# Patient Record
Sex: FEMALE | Race: WHITE | NOT HISPANIC OR LATINO | Employment: OTHER | URBAN - METROPOLITAN AREA
[De-identification: names, ages, dates, MRNs, and addresses within clinical notes are randomized per-mention and may not be internally consistent; named-entity substitution may affect disease eponyms.]

---

## 2017-01-01 ENCOUNTER — HOSPITAL ENCOUNTER (INPATIENT)
Facility: OTHER | Age: 77
LOS: 4 days | DRG: 208 | End: 2017-12-22
Attending: INTERNAL MEDICINE | Admitting: HOSPITALIST
Payer: MEDICARE

## 2017-01-01 VITALS
SYSTOLIC BLOOD PRESSURE: 134 MMHG | WEIGHT: 246.94 LBS | OXYGEN SATURATION: 94 % | BODY MASS INDEX: 45.44 KG/M2 | DIASTOLIC BLOOD PRESSURE: 60 MMHG | TEMPERATURE: 98 F | HEIGHT: 62 IN

## 2017-01-01 DIAGNOSIS — J96.21 ACUTE ON CHRONIC RESPIRATORY FAILURE WITH HYPOXIA AND HYPERCAPNIA: ICD-10-CM

## 2017-01-01 DIAGNOSIS — I10 ESSENTIAL HYPERTENSION: ICD-10-CM

## 2017-01-01 DIAGNOSIS — Z79.4 TYPE 2 DIABETES MELLITUS WITH STAGE 4 CHRONIC KIDNEY DISEASE, WITH LONG-TERM CURRENT USE OF INSULIN: ICD-10-CM

## 2017-01-01 DIAGNOSIS — N18.4 TYPE 2 DIABETES MELLITUS WITH STAGE 4 CHRONIC KIDNEY DISEASE, WITH LONG-TERM CURRENT USE OF INSULIN: ICD-10-CM

## 2017-01-01 DIAGNOSIS — I46.9 CARDIAC ARREST: ICD-10-CM

## 2017-01-01 DIAGNOSIS — E11.22 TYPE 2 DIABETES MELLITUS WITH STAGE 4 CHRONIC KIDNEY DISEASE, WITH LONG-TERM CURRENT USE OF INSULIN: ICD-10-CM

## 2017-01-01 DIAGNOSIS — I46.9 SUDDEN CARDIAC ARREST: ICD-10-CM

## 2017-01-01 DIAGNOSIS — J96.22 ACUTE ON CHRONIC RESPIRATORY FAILURE WITH HYPOXIA AND HYPERCAPNIA: ICD-10-CM

## 2017-01-01 DIAGNOSIS — I50.30 (HFPEF) HEART FAILURE WITH PRESERVED EJECTION FRACTION: ICD-10-CM

## 2017-01-01 DIAGNOSIS — R07.9 CHEST PAIN: ICD-10-CM

## 2017-01-01 DIAGNOSIS — J44.9 CHRONIC OBSTRUCTIVE PULMONARY DISEASE, UNSPECIFIED COPD TYPE: ICD-10-CM

## 2017-01-01 LAB
ALBUMIN SERPL BCP-MCNC: 1.8 G/DL
ALBUMIN SERPL BCP-MCNC: 1.8 G/DL
ALBUMIN SERPL BCP-MCNC: 1.9 G/DL
ALLENS TEST: ABNORMAL
ALP SERPL-CCNC: 111 U/L
ALP SERPL-CCNC: 152 U/L
ALP SERPL-CCNC: 84 U/L
ALT SERPL W/O P-5'-P-CCNC: 53 U/L
ALT SERPL W/O P-5'-P-CCNC: 56 U/L
ALT SERPL W/O P-5'-P-CCNC: 68 U/L
AMORPH CRY URNS QL MICRO: ABNORMAL
ANION GAP SERPL CALC-SCNC: 10 MMOL/L
ANION GAP SERPL CALC-SCNC: 11 MMOL/L
ANION GAP SERPL CALC-SCNC: 12 MMOL/L
ANION GAP SERPL CALC-SCNC: 13 MMOL/L
ANION GAP SERPL CALC-SCNC: 13 MMOL/L
ANION GAP SERPL CALC-SCNC: 14 MMOL/L
ANION GAP SERPL CALC-SCNC: 8 MMOL/L
ANION GAP SERPL CALC-SCNC: 9 MMOL/L
APTT BLDCRRT: 26.1 SEC
AST SERPL-CCNC: 169 U/L
AST SERPL-CCNC: 69 U/L
AST SERPL-CCNC: 98 U/L
BACTERIA #/AREA URNS HPF: ABNORMAL /HPF
BACTERIA SPEC AEROBE CULT: NORMAL
BACTERIA UR CULT: NO GROWTH
BASOPHILS # BLD AUTO: 0 K/UL
BASOPHILS # BLD AUTO: 0.01 K/UL
BASOPHILS # BLD AUTO: 0.02 K/UL
BASOPHILS # BLD AUTO: ABNORMAL K/UL
BASOPHILS NFR BLD: 0 %
BASOPHILS NFR BLD: 0 %
BASOPHILS NFR BLD: 0.1 %
BASOPHILS NFR BLD: 0.1 %
BILIRUB SERPL-MCNC: 0.4 MG/DL
BILIRUB SERPL-MCNC: 0.5 MG/DL
BILIRUB SERPL-MCNC: 0.6 MG/DL
BILIRUB UR QL STRIP: NEGATIVE
BNP SERPL-MCNC: 41 PG/ML
BUN SERPL-MCNC: 22 MG/DL
BUN SERPL-MCNC: 27 MG/DL
BUN SERPL-MCNC: 29 MG/DL
BUN SERPL-MCNC: 32 MG/DL
BUN SERPL-MCNC: 33 MG/DL
BUN SERPL-MCNC: 36 MG/DL
BUN SERPL-MCNC: 37 MG/DL
BUN SERPL-MCNC: 37 MG/DL
BUN SERPL-MCNC: 43 MG/DL
BUN SERPL-MCNC: 45 MG/DL
BUN SERPL-MCNC: 46 MG/DL
BUN SERPL-MCNC: 50 MG/DL
BUN SERPL-MCNC: 61 MG/DL
BURR CELLS BLD QL SMEAR: ABNORMAL
BURR CELLS BLD QL SMEAR: ABNORMAL
CA-I BLDV-SCNC: 0.99 MMOL/L
CA-I BLDV-SCNC: 1.01 MMOL/L
CA-I BLDV-SCNC: 1.04 MMOL/L
CA-I BLDV-SCNC: 1.07 MMOL/L
CA-I BLDV-SCNC: 1.1 MMOL/L
CALCIUM SERPL-MCNC: 7.1 MG/DL
CALCIUM SERPL-MCNC: 7.5 MG/DL
CALCIUM SERPL-MCNC: 7.6 MG/DL
CALCIUM SERPL-MCNC: 7.7 MG/DL
CALCIUM SERPL-MCNC: 7.8 MG/DL
CALCIUM SERPL-MCNC: 8 MG/DL
CHLORIDE SERPL-SCNC: 100 MMOL/L
CHLORIDE SERPL-SCNC: 101 MMOL/L
CHLORIDE SERPL-SCNC: 102 MMOL/L
CHLORIDE SERPL-SCNC: 102 MMOL/L
CHLORIDE SERPL-SCNC: 103 MMOL/L
CHLORIDE SERPL-SCNC: 103 MMOL/L
CHLORIDE SERPL-SCNC: 106 MMOL/L
CHLORIDE SERPL-SCNC: 107 MMOL/L
CHLORIDE SERPL-SCNC: 107 MMOL/L
CHLORIDE SERPL-SCNC: 109 MMOL/L
CHLORIDE SERPL-SCNC: 109 MMOL/L
CHLORIDE SERPL-SCNC: 112 MMOL/L
CHLORIDE SERPL-SCNC: 114 MMOL/L
CHOLEST SERPL-MCNC: 151 MG/DL
CHOLEST/HDLC SERPL: 3.1 {RATIO}
CK MB SERPL-MCNC: 25.9 NG/ML
CK MB SERPL-MCNC: 42.7 NG/ML
CK MB SERPL-MCNC: 89 NG/ML
CK MB SERPL-RTO: 2.7 %
CK MB SERPL-RTO: 4.2 %
CK MB SERPL-RTO: 4.5 %
CK SERPL-CCNC: 1015 U/L
CK SERPL-CCNC: 1015 U/L
CK SERPL-CCNC: 2991 U/L
CK SERPL-CCNC: 3330 U/L
CK SERPL-CCNC: 578 U/L
CK SERPL-CCNC: 578 U/L
CK SERPL-CCNC: 875 U/L
CK SERPL-CCNC: ABNORMAL U/L
CLARITY UR: ABNORMAL
CO2 SERPL-SCNC: 17 MMOL/L
CO2 SERPL-SCNC: 17 MMOL/L
CO2 SERPL-SCNC: 18 MMOL/L
CO2 SERPL-SCNC: 19 MMOL/L
CO2 SERPL-SCNC: 20 MMOL/L
CO2 SERPL-SCNC: 22 MMOL/L
CO2 SERPL-SCNC: 23 MMOL/L
CO2 SERPL-SCNC: 24 MMOL/L
COLOR UR: YELLOW
CREAT SERPL-MCNC: 1.3 MG/DL
CREAT SERPL-MCNC: 1.6 MG/DL
CREAT SERPL-MCNC: 1.8 MG/DL
CREAT SERPL-MCNC: 2 MG/DL
CREAT SERPL-MCNC: 2 MG/DL
CREAT SERPL-MCNC: 2.1 MG/DL
CREAT SERPL-MCNC: 2.2 MG/DL
CREAT SERPL-MCNC: 2.3 MG/DL
CREAT SERPL-MCNC: 2.4 MG/DL
CREAT SERPL-MCNC: 3.2 MG/DL
CREAT SERPL-MCNC: 3.8 MG/DL
DELSYS: ABNORMAL
DIASTOLIC DYSFUNCTION: YES
DIFFERENTIAL METHOD: ABNORMAL
EOSINOPHIL # BLD AUTO: 0 K/UL
EOSINOPHIL # BLD AUTO: ABNORMAL K/UL
EOSINOPHIL NFR BLD: 0 %
EOSINOPHIL NFR BLD: 0 %
EOSINOPHIL NFR BLD: 0.1 %
EOSINOPHIL NFR BLD: 0.4 %
ERYTHROCYTE [DISTWIDTH] IN BLOOD BY AUTOMATED COUNT: 13.7 %
ERYTHROCYTE [DISTWIDTH] IN BLOOD BY AUTOMATED COUNT: 14.2 %
ERYTHROCYTE [DISTWIDTH] IN BLOOD BY AUTOMATED COUNT: 15.2 %
ERYTHROCYTE [DISTWIDTH] IN BLOOD BY AUTOMATED COUNT: 15.4 %
ERYTHROCYTE [SEDIMENTATION RATE] IN BLOOD BY WESTERGREN METHOD: 14 MM/H
ERYTHROCYTE [SEDIMENTATION RATE] IN BLOOD BY WESTERGREN METHOD: 14 MM/H
ERYTHROCYTE [SEDIMENTATION RATE] IN BLOOD BY WESTERGREN METHOD: 18 MM/H
ERYTHROCYTE [SEDIMENTATION RATE] IN BLOOD BY WESTERGREN METHOD: 18 MM/H
ERYTHROCYTE [SEDIMENTATION RATE] IN BLOOD BY WESTERGREN METHOD: 20 MM/H
ERYTHROCYTE [SEDIMENTATION RATE] IN BLOOD BY WESTERGREN METHOD: 21 MM/H
ERYTHROCYTE [SEDIMENTATION RATE] IN BLOOD BY WESTERGREN METHOD: 21 MM/H
ERYTHROCYTE [SEDIMENTATION RATE] IN BLOOD BY WESTERGREN METHOD: 26 MM/H
ERYTHROCYTE [SEDIMENTATION RATE] IN BLOOD BY WESTERGREN METHOD: 28 MM/H
EST. GFR  (AFRICAN AMERICAN): 13 ML/MIN/1.73 M^2
EST. GFR  (AFRICAN AMERICAN): 15 ML/MIN/1.73 M^2
EST. GFR  (AFRICAN AMERICAN): 22 ML/MIN/1.73 M^2
EST. GFR  (AFRICAN AMERICAN): 23 ML/MIN/1.73 M^2
EST. GFR  (AFRICAN AMERICAN): 24 ML/MIN/1.73 M^2
EST. GFR  (AFRICAN AMERICAN): 26 ML/MIN/1.73 M^2
EST. GFR  (AFRICAN AMERICAN): 27 ML/MIN/1.73 M^2
EST. GFR  (AFRICAN AMERICAN): 27 ML/MIN/1.73 M^2
EST. GFR  (AFRICAN AMERICAN): 31 ML/MIN/1.73 M^2
EST. GFR  (AFRICAN AMERICAN): 36 ML/MIN/1.73 M^2
EST. GFR  (AFRICAN AMERICAN): 46 ML/MIN/1.73 M^2
EST. GFR  (NON AFRICAN AMERICAN): 11 ML/MIN/1.73 M^2
EST. GFR  (NON AFRICAN AMERICAN): 13 ML/MIN/1.73 M^2
EST. GFR  (NON AFRICAN AMERICAN): 19 ML/MIN/1.73 M^2
EST. GFR  (NON AFRICAN AMERICAN): 20 ML/MIN/1.73 M^2
EST. GFR  (NON AFRICAN AMERICAN): 21 ML/MIN/1.73 M^2
EST. GFR  (NON AFRICAN AMERICAN): 22 ML/MIN/1.73 M^2
EST. GFR  (NON AFRICAN AMERICAN): 24 ML/MIN/1.73 M^2
EST. GFR  (NON AFRICAN AMERICAN): 24 ML/MIN/1.73 M^2
EST. GFR  (NON AFRICAN AMERICAN): 27 ML/MIN/1.73 M^2
EST. GFR  (NON AFRICAN AMERICAN): 31 ML/MIN/1.73 M^2
EST. GFR  (NON AFRICAN AMERICAN): 40 ML/MIN/1.73 M^2
ESTIMATED AVG GLUCOSE: 246 MG/DL
ESTIMATED AVG GLUCOSE: 249 MG/DL
ETCO2: 19
ETCO2: 27
ETCO2: 29
ETCO2: 32
ETCO2: 33
ETCO2: 35
ETCO2: 40
ETCO2: 43
FIO2: 100
FIO2: 40
FIO2: 45
FIO2: 45
FLOW: 6
GLUCOSE SERPL-MCNC: 146 MG/DL
GLUCOSE SERPL-MCNC: 184 MG/DL
GLUCOSE SERPL-MCNC: 213 MG/DL
GLUCOSE SERPL-MCNC: 214 MG/DL
GLUCOSE SERPL-MCNC: 214 MG/DL
GLUCOSE SERPL-MCNC: 223 MG/DL
GLUCOSE SERPL-MCNC: 229 MG/DL
GLUCOSE SERPL-MCNC: 229 MG/DL
GLUCOSE SERPL-MCNC: 234 MG/DL
GLUCOSE SERPL-MCNC: 253 MG/DL
GLUCOSE SERPL-MCNC: 253 MG/DL
GLUCOSE SERPL-MCNC: 277 MG/DL
GLUCOSE SERPL-MCNC: 362 MG/DL
GLUCOSE SERPL-MCNC: 443 MG/DL
GLUCOSE SERPL-MCNC: 443 MG/DL
GLUCOSE SERPL-MCNC: 479 MG/DL
GLUCOSE SERPL-MCNC: 479 MG/DL
GLUCOSE SERPL-MCNC: 480 MG/DL
GLUCOSE SERPL-MCNC: 480 MG/DL
GLUCOSE UR QL STRIP: ABNORMAL
GRAM STN SPEC: NORMAL
HBA1C MFR BLD HPLC: 10.2 %
HBA1C MFR BLD HPLC: 10.3 %
HCO3 UR-SCNC: 19.1 MMOL/L (ref 24–28)
HCO3 UR-SCNC: 20.6 MMOL/L (ref 24–28)
HCO3 UR-SCNC: 21.1 MMOL/L (ref 24–28)
HCO3 UR-SCNC: 21.1 MMOL/L (ref 24–28)
HCO3 UR-SCNC: 21.2 MMOL/L (ref 24–28)
HCO3 UR-SCNC: 22.1 MMOL/L (ref 24–28)
HCO3 UR-SCNC: 22.5 MMOL/L (ref 24–28)
HCO3 UR-SCNC: 22.7 MMOL/L (ref 24–28)
HCO3 UR-SCNC: 23.5 MMOL/L (ref 24–28)
HCO3 UR-SCNC: 26.9 MMOL/L (ref 24–28)
HCT VFR BLD AUTO: 26.1 %
HCT VFR BLD AUTO: 26.9 %
HCT VFR BLD AUTO: 27.3 %
HCT VFR BLD AUTO: 40.5 %
HDLC SERPL-MCNC: 48 MG/DL
HDLC SERPL: 31.8 %
HGB BLD-MCNC: 12.5 G/DL
HGB BLD-MCNC: 8.4 G/DL
HGB BLD-MCNC: 8.5 G/DL
HGB BLD-MCNC: 9.2 G/DL
HGB UR QL STRIP: ABNORMAL
HYALINE CASTS #/AREA URNS LPF: 0 /LPF
INR PPP: 1.1
KETONES UR QL STRIP: NEGATIVE
LACTATE SERPL-SCNC: 2.6 MMOL/L
LACTATE SERPL-SCNC: 2.7 MMOL/L
LACTATE SERPL-SCNC: 2.7 MMOL/L
LACTATE SERPL-SCNC: 3.6 MMOL/L
LDLC SERPL CALC-MCNC: 82.8 MG/DL
LEUKOCYTE ESTERASE UR QL STRIP: NEGATIVE
LYMPHOCYTES # BLD AUTO: 0.6 K/UL
LYMPHOCYTES # BLD AUTO: 0.7 K/UL
LYMPHOCYTES # BLD AUTO: 0.8 K/UL
LYMPHOCYTES # BLD AUTO: ABNORMAL K/UL
LYMPHOCYTES NFR BLD: 5 %
LYMPHOCYTES NFR BLD: 5.8 %
LYMPHOCYTES NFR BLD: 6 %
LYMPHOCYTES NFR BLD: 7.7 %
MAGNESIUM SERPL-MCNC: 1.1 MG/DL
MAGNESIUM SERPL-MCNC: 1.2 MG/DL
MAGNESIUM SERPL-MCNC: 1.3 MG/DL
MAGNESIUM SERPL-MCNC: 1.5 MG/DL
MAGNESIUM SERPL-MCNC: 1.6 MG/DL
MAGNESIUM SERPL-MCNC: 1.6 MG/DL
MAGNESIUM SERPL-MCNC: 1.7 MG/DL
MAGNESIUM SERPL-MCNC: 1.7 MG/DL
MCH RBC QN AUTO: 27 PG
MCH RBC QN AUTO: 27.6 PG
MCH RBC QN AUTO: 27.7 PG
MCH RBC QN AUTO: 27.7 PG
MCHC RBC AUTO-ENTMCNC: 30.9 G/DL
MCHC RBC AUTO-ENTMCNC: 31.6 G/DL
MCHC RBC AUTO-ENTMCNC: 32.2 G/DL
MCHC RBC AUTO-ENTMCNC: 33.7 G/DL
MCV RBC AUTO: 82 FL
MCV RBC AUTO: 85 FL
MCV RBC AUTO: 86 FL
MCV RBC AUTO: 90 FL
MICROSCOPIC COMMENT: ABNORMAL
MIN VOL: 10.9
MIN VOL: 6
MIN VOL: 7
MIN VOL: 8
MIN VOL: 8.86
MIN VOL: 9
MODE: ABNORMAL
MONOCYTES # BLD AUTO: 0.3 K/UL
MONOCYTES # BLD AUTO: 0.5 K/UL
MONOCYTES # BLD AUTO: 0.6 K/UL
MONOCYTES # BLD AUTO: ABNORMAL K/UL
MONOCYTES NFR BLD: 2 %
MONOCYTES NFR BLD: 2.3 %
MONOCYTES NFR BLD: 4.5 %
MONOCYTES NFR BLD: 6.8 %
NEUTROPHILS # BLD AUTO: 12.4 K/UL
NEUTROPHILS # BLD AUTO: 7.2 K/UL
NEUTROPHILS # BLD AUTO: 9.7 K/UL
NEUTROPHILS NFR BLD: 84.4 %
NEUTROPHILS NFR BLD: 89.7 %
NEUTROPHILS NFR BLD: 91.5 %
NEUTROPHILS NFR BLD: 93 %
NITRITE UR QL STRIP: NEGATIVE
NONHDLC SERPL-MCNC: 103 MG/DL
OVALOCYTES BLD QL SMEAR: ABNORMAL
PCO2 BLDA: 29.5 MMHG (ref 35–45)
PCO2 BLDA: 38.7 MMHG (ref 35–45)
PCO2 BLDA: 41.3 MMHG (ref 35–45)
PCO2 BLDA: 41.5 MMHG (ref 35–45)
PCO2 BLDA: 46.6 MMHG (ref 35–45)
PCO2 BLDA: 51.3 MMHG (ref 35–45)
PCO2 BLDA: 51.5 MMHG (ref 35–45)
PCO2 BLDA: 58.4 MMHG (ref 35–45)
PCO2 BLDA: 63 MMHG (ref 35–45)
PCO2 BLDA: 72.8 MMHG (ref 35–45)
PEEP: 5
PH SMN: 7.13 [PH] (ref 7.35–7.45)
PH SMN: 7.18 [PH] (ref 7.35–7.45)
PH SMN: 7.21 [PH] (ref 7.35–7.45)
PH SMN: 7.25 [PH] (ref 7.35–7.45)
PH SMN: 7.25 [PH] (ref 7.35–7.45)
PH SMN: 7.26 [PH] (ref 7.35–7.45)
PH SMN: 7.31 [PH] (ref 7.35–7.45)
PH SMN: 7.32 [PH] (ref 7.35–7.45)
PH SMN: 7.37 [PH] (ref 7.35–7.45)
PH SMN: 7.42 [PH] (ref 7.35–7.45)
PH UR STRIP: 6 [PH] (ref 5–8)
PHOSPHATE SERPL-MCNC: 1.9 MG/DL
PHOSPHATE SERPL-MCNC: 2.6 MG/DL
PHOSPHATE SERPL-MCNC: 2.7 MG/DL
PHOSPHATE SERPL-MCNC: 2.8 MG/DL
PHOSPHATE SERPL-MCNC: 2.9 MG/DL
PHOSPHATE SERPL-MCNC: 3.7 MG/DL
PHOSPHATE SERPL-MCNC: 4.2 MG/DL
PHOSPHATE SERPL-MCNC: 4.9 MG/DL
PHOSPHATE SERPL-MCNC: 5 MG/DL
PIP: 41
PIP: 50
PIP: 52
PIP: 53
PIP: 54
PIP: 61
PLATELET # BLD AUTO: 135 K/UL
PLATELET # BLD AUTO: 58 K/UL
PLATELET # BLD AUTO: 63 K/UL
PLATELET # BLD AUTO: 69 K/UL
PLATELET BLD QL SMEAR: ABNORMAL
PMV BLD AUTO: 10.2 FL
PMV BLD AUTO: 8.9 FL
PMV BLD AUTO: 9.6 FL
PMV BLD AUTO: 9.8 FL
PO2 BLDA: 135 MMHG (ref 80–100)
PO2 BLDA: 140 MMHG (ref 80–100)
PO2 BLDA: 456 MMHG (ref 80–100)
PO2 BLDA: 498 MMHG (ref 80–100)
PO2 BLDA: 525 MMHG (ref 80–100)
PO2 BLDA: 530 MMHG (ref 80–100)
PO2 BLDA: 550 MMHG (ref 80–100)
PO2 BLDA: 554 MMHG (ref 80–100)
PO2 BLDA: 560 MMHG (ref 80–100)
PO2 BLDA: 82 MMHG (ref 80–100)
POC BE: -2 MMOL/L
POC BE: -3 MMOL/L
POC BE: -4 MMOL/L
POC BE: -4 MMOL/L
POC BE: -5 MMOL/L
POC BE: -6 MMOL/L
POC BE: -6 MMOL/L
POC BE: -8 MMOL/L
POC SATURATED O2: 100 % (ref 95–100)
POC SATURATED O2: 93 % (ref 95–100)
POC SATURATED O2: 99 % (ref 95–100)
POC SATURATED O2: 99 % (ref 95–100)
POCT GLUCOSE: 142 MG/DL (ref 70–110)
POCT GLUCOSE: 144 MG/DL (ref 70–110)
POCT GLUCOSE: 145 MG/DL (ref 70–110)
POCT GLUCOSE: 150 MG/DL (ref 70–110)
POCT GLUCOSE: 151 MG/DL (ref 70–110)
POCT GLUCOSE: 154 MG/DL (ref 70–110)
POCT GLUCOSE: 154 MG/DL (ref 70–110)
POCT GLUCOSE: 156 MG/DL (ref 70–110)
POCT GLUCOSE: 156 MG/DL (ref 70–110)
POCT GLUCOSE: 157 MG/DL (ref 70–110)
POCT GLUCOSE: 157 MG/DL (ref 70–110)
POCT GLUCOSE: 158 MG/DL (ref 70–110)
POCT GLUCOSE: 158 MG/DL (ref 70–110)
POCT GLUCOSE: 159 MG/DL (ref 70–110)
POCT GLUCOSE: 160 MG/DL (ref 70–110)
POCT GLUCOSE: 161 MG/DL (ref 70–110)
POCT GLUCOSE: 163 MG/DL (ref 70–110)
POCT GLUCOSE: 165 MG/DL (ref 70–110)
POCT GLUCOSE: 166 MG/DL (ref 70–110)
POCT GLUCOSE: 169 MG/DL (ref 70–110)
POCT GLUCOSE: 169 MG/DL (ref 70–110)
POCT GLUCOSE: 170 MG/DL (ref 70–110)
POCT GLUCOSE: 172 MG/DL (ref 70–110)
POCT GLUCOSE: 174 MG/DL (ref 70–110)
POCT GLUCOSE: 174 MG/DL (ref 70–110)
POCT GLUCOSE: 179 MG/DL (ref 70–110)
POCT GLUCOSE: 180 MG/DL (ref 70–110)
POCT GLUCOSE: 181 MG/DL (ref 70–110)
POCT GLUCOSE: 188 MG/DL (ref 70–110)
POCT GLUCOSE: 189 MG/DL (ref 70–110)
POCT GLUCOSE: 196 MG/DL (ref 70–110)
POCT GLUCOSE: 197 MG/DL (ref 70–110)
POCT GLUCOSE: 202 MG/DL (ref 70–110)
POCT GLUCOSE: 213 MG/DL (ref 70–110)
POCT GLUCOSE: 216 MG/DL (ref 70–110)
POCT GLUCOSE: 226 MG/DL (ref 70–110)
POCT GLUCOSE: 227 MG/DL (ref 70–110)
POCT GLUCOSE: 238 MG/DL (ref 70–110)
POCT GLUCOSE: 239 MG/DL (ref 70–110)
POCT GLUCOSE: 249 MG/DL (ref 70–110)
POCT GLUCOSE: 291 MG/DL (ref 70–110)
POCT GLUCOSE: 342 MG/DL (ref 70–110)
POCT GLUCOSE: 369 MG/DL (ref 70–110)
POCT GLUCOSE: 373 MG/DL (ref 70–110)
POCT GLUCOSE: 374 MG/DL (ref 70–110)
POCT GLUCOSE: 384 MG/DL (ref 70–110)
POCT GLUCOSE: 387 MG/DL (ref 70–110)
POCT GLUCOSE: 390 MG/DL (ref 70–110)
POCT GLUCOSE: 396 MG/DL (ref 70–110)
POCT GLUCOSE: 450 MG/DL (ref 70–110)
POIKILOCYTOSIS BLD QL SMEAR: SLIGHT
POTASSIUM SERPL-SCNC: 3.2 MMOL/L
POTASSIUM SERPL-SCNC: 3.4 MMOL/L
POTASSIUM SERPL-SCNC: 3.5 MMOL/L
POTASSIUM SERPL-SCNC: 3.6 MMOL/L
POTASSIUM SERPL-SCNC: 3.7 MMOL/L
POTASSIUM SERPL-SCNC: 3.7 MMOL/L
POTASSIUM SERPL-SCNC: 3.8 MMOL/L
POTASSIUM SERPL-SCNC: 4.3 MMOL/L
POTASSIUM SERPL-SCNC: 4.3 MMOL/L
POTASSIUM SERPL-SCNC: 4.4 MMOL/L
POTASSIUM SERPL-SCNC: 4.5 MMOL/L
POTASSIUM SERPL-SCNC: 4.5 MMOL/L
POTASSIUM SERPL-SCNC: 4.7 MMOL/L
PROCALCITONIN SERPL IA-MCNC: 13.3 NG/ML
PROT SERPL-MCNC: 4.7 G/DL
PROT SERPL-MCNC: 4.8 G/DL
PROT SERPL-MCNC: 5.2 G/DL
PROT UR QL STRIP: ABNORMAL
PROTHROMBIN TIME: 11.7 SEC
RBC # BLD AUTO: 3.04 M/UL
RBC # BLD AUTO: 3.15 M/UL
RBC # BLD AUTO: 3.32 M/UL
RBC # BLD AUTO: 4.51 M/UL
RBC #/AREA URNS HPF: 3 /HPF (ref 0–4)
RETIRED EF AND QEF - SEE NOTES: 58 (ref 55–65)
SAMPLE: ABNORMAL
SITE: ABNORMAL
SODIUM SERPL-SCNC: 131 MMOL/L
SODIUM SERPL-SCNC: 132 MMOL/L
SODIUM SERPL-SCNC: 133 MMOL/L
SODIUM SERPL-SCNC: 134 MMOL/L
SODIUM SERPL-SCNC: 135 MMOL/L
SODIUM SERPL-SCNC: 136 MMOL/L
SODIUM SERPL-SCNC: 136 MMOL/L
SODIUM SERPL-SCNC: 137 MMOL/L
SODIUM SERPL-SCNC: 137 MMOL/L
SODIUM SERPL-SCNC: 139 MMOL/L
SODIUM SERPL-SCNC: 140 MMOL/L
SODIUM SERPL-SCNC: 141 MMOL/L
SODIUM SERPL-SCNC: 141 MMOL/L
SP GR UR STRIP: 1.02 (ref 1–1.03)
SP02: 100
SP02: 95
SP02: 97
SP02: 99
TRIGL SERPL-MCNC: 101 MG/DL
TROPONIN I SERPL DL<=0.01 NG/ML-MCNC: 0.27 NG/ML
TROPONIN I SERPL DL<=0.01 NG/ML-MCNC: 0.62 NG/ML
TROPONIN I SERPL DL<=0.01 NG/ML-MCNC: 1.15 NG/ML
TROPONIN I SERPL DL<=0.01 NG/ML-MCNC: 2.25 NG/ML
URN SPEC COLLECT METH UR: ABNORMAL
UROBILINOGEN UR STRIP-ACNC: 1 EU/DL
VANCOMYCIN SERPL-MCNC: 37.3 UG/ML
VT: 450
VT: 500
VT: 500
WBC # BLD AUTO: 10.82 K/UL
WBC # BLD AUTO: 12.65 K/UL
WBC # BLD AUTO: 13.72 K/UL
WBC # BLD AUTO: 8.55 K/UL
WBC #/AREA URNS HPF: 10 /HPF (ref 0–5)

## 2017-01-01 PROCEDURE — 93010 ELECTROCARDIOGRAM REPORT: CPT | Mod: ,,, | Performed by: INTERNAL MEDICINE

## 2017-01-01 PROCEDURE — 84484 ASSAY OF TROPONIN QUANT: CPT

## 2017-01-01 PROCEDURE — 99233 SBSQ HOSP IP/OBS HIGH 50: CPT | Mod: ,,, | Performed by: HOSPITALIST

## 2017-01-01 PROCEDURE — 83036 HEMOGLOBIN GLYCOSYLATED A1C: CPT | Mod: 91

## 2017-01-01 PROCEDURE — 36600 WITHDRAWAL OF ARTERIAL BLOOD: CPT

## 2017-01-01 PROCEDURE — 99900035 HC TECH TIME PER 15 MIN (STAT)

## 2017-01-01 PROCEDURE — 84100 ASSAY OF PHOSPHORUS: CPT

## 2017-01-01 PROCEDURE — 63600175 PHARM REV CODE 636 W HCPCS: Performed by: INTERNAL MEDICINE

## 2017-01-01 PROCEDURE — 25000242 PHARM REV CODE 250 ALT 637 W/ HCPCS: Performed by: INTERNAL MEDICINE

## 2017-01-01 PROCEDURE — 93005 ELECTROCARDIOGRAM TRACING: CPT

## 2017-01-01 PROCEDURE — 94640 AIRWAY INHALATION TREATMENT: CPT

## 2017-01-01 PROCEDURE — 36620 INSERTION CATHETER ARTERY: CPT | Mod: ,,, | Performed by: INTERNAL MEDICINE

## 2017-01-01 PROCEDURE — 85007 BL SMEAR W/DIFF WBC COUNT: CPT

## 2017-01-01 PROCEDURE — 84100 ASSAY OF PHOSPHORUS: CPT | Mod: 91

## 2017-01-01 PROCEDURE — 94770 HC EXHALED C02 TEST: CPT

## 2017-01-01 PROCEDURE — 82330 ASSAY OF CALCIUM: CPT

## 2017-01-01 PROCEDURE — C9113 INJ PANTOPRAZOLE SODIUM, VIA: HCPCS | Performed by: PHYSICIAN ASSISTANT

## 2017-01-01 PROCEDURE — 94761 N-INVAS EAR/PLS OXIMETRY MLT: CPT

## 2017-01-01 PROCEDURE — 93306 TTE W/DOPPLER COMPLETE: CPT

## 2017-01-01 PROCEDURE — 80061 LIPID PANEL: CPT

## 2017-01-01 PROCEDURE — 25000003 PHARM REV CODE 250: Performed by: PHYSICIAN ASSISTANT

## 2017-01-01 PROCEDURE — 83735 ASSAY OF MAGNESIUM: CPT | Mod: 91

## 2017-01-01 PROCEDURE — 85025 COMPLETE CBC W/AUTO DIFF WBC: CPT

## 2017-01-01 PROCEDURE — 87070 CULTURE OTHR SPECIMN AEROBIC: CPT

## 2017-01-01 PROCEDURE — 83735 ASSAY OF MAGNESIUM: CPT

## 2017-01-01 PROCEDURE — 84145 PROCALCITONIN (PCT): CPT

## 2017-01-01 PROCEDURE — 25000003 PHARM REV CODE 250: Performed by: INTERNAL MEDICINE

## 2017-01-01 PROCEDURE — 94003 VENT MGMT INPAT SUBQ DAY: CPT

## 2017-01-01 PROCEDURE — 99233 SBSQ HOSP IP/OBS HIGH 50: CPT | Mod: GC,,, | Performed by: INTERNAL MEDICINE

## 2017-01-01 PROCEDURE — 99291 CRITICAL CARE FIRST HOUR: CPT | Mod: GC,,, | Performed by: INTERNAL MEDICINE

## 2017-01-01 PROCEDURE — 80053 COMPREHEN METABOLIC PANEL: CPT

## 2017-01-01 PROCEDURE — 87205 SMEAR GRAM STAIN: CPT

## 2017-01-01 PROCEDURE — 81000 URINALYSIS NONAUTO W/SCOPE: CPT

## 2017-01-01 PROCEDURE — 85610 PROTHROMBIN TIME: CPT

## 2017-01-01 PROCEDURE — 63600175 PHARM REV CODE 636 W HCPCS: Performed by: STUDENT IN AN ORGANIZED HEALTH CARE EDUCATION/TRAINING PROGRAM

## 2017-01-01 PROCEDURE — 20000000 HC ICU ROOM

## 2017-01-01 PROCEDURE — 99900026 HC AIRWAY MAINTENANCE (STAT)

## 2017-01-01 PROCEDURE — 87106 FUNGI IDENTIFICATION YEAST: CPT

## 2017-01-01 PROCEDURE — 82550 ASSAY OF CK (CPK): CPT

## 2017-01-01 PROCEDURE — 80048 BASIC METABOLIC PNL TOTAL CA: CPT

## 2017-01-01 PROCEDURE — 63600175 PHARM REV CODE 636 W HCPCS: Performed by: HOSPITALIST

## 2017-01-01 PROCEDURE — 82803 BLOOD GASES ANY COMBINATION: CPT

## 2017-01-01 PROCEDURE — 82553 CREATINE MB FRACTION: CPT

## 2017-01-01 PROCEDURE — 97802 MEDICAL NUTRITION INDIV IN: CPT

## 2017-01-01 PROCEDURE — 27000221 HC OXYGEN, UP TO 24 HOURS

## 2017-01-01 PROCEDURE — 94002 VENT MGMT INPAT INIT DAY: CPT

## 2017-01-01 PROCEDURE — 84484 ASSAY OF TROPONIN QUANT: CPT | Mod: 91

## 2017-01-01 PROCEDURE — 63600175 PHARM REV CODE 636 W HCPCS: Performed by: PHYSICIAN ASSISTANT

## 2017-01-01 PROCEDURE — 80048 BASIC METABOLIC PNL TOTAL CA: CPT | Mod: 91

## 2017-01-01 PROCEDURE — 5A1945Z RESPIRATORY VENTILATION, 24-96 CONSECUTIVE HOURS: ICD-10-PCS | Performed by: HOSPITALIST

## 2017-01-01 PROCEDURE — 82553 CREATINE MB FRACTION: CPT | Mod: 91

## 2017-01-01 PROCEDURE — 85027 COMPLETE CBC AUTOMATED: CPT

## 2017-01-01 PROCEDURE — 99223 1ST HOSP IP/OBS HIGH 75: CPT | Mod: AI,,, | Performed by: PHYSICIAN ASSISTANT

## 2017-01-01 PROCEDURE — 99291 CRITICAL CARE FIRST HOUR: CPT | Mod: 25,GC,, | Performed by: INTERNAL MEDICINE

## 2017-01-01 PROCEDURE — 83605 ASSAY OF LACTIC ACID: CPT

## 2017-01-01 PROCEDURE — 80202 ASSAY OF VANCOMYCIN: CPT

## 2017-01-01 PROCEDURE — 25000003 PHARM REV CODE 250: Performed by: HOSPITALIST

## 2017-01-01 PROCEDURE — 83036 HEMOGLOBIN GLYCOSYLATED A1C: CPT

## 2017-01-01 PROCEDURE — 83605 ASSAY OF LACTIC ACID: CPT | Mod: 91

## 2017-01-01 PROCEDURE — 99233 SBSQ HOSP IP/OBS HIGH 50: CPT | Mod: ,,, | Performed by: PSYCHIATRY & NEUROLOGY

## 2017-01-01 PROCEDURE — 95819 EEG AWAKE AND ASLEEP: CPT | Mod: 26,,, | Performed by: PSYCHIATRY & NEUROLOGY

## 2017-01-01 PROCEDURE — 99238 HOSP IP/OBS DSCHRG MGMT 30/<: CPT | Mod: ,,, | Performed by: HOSPITALIST

## 2017-01-01 PROCEDURE — 83880 ASSAY OF NATRIURETIC PEPTIDE: CPT

## 2017-01-01 PROCEDURE — 82330 ASSAY OF CALCIUM: CPT | Mod: 91

## 2017-01-01 PROCEDURE — 87040 BLOOD CULTURE FOR BACTERIA: CPT | Mod: 59

## 2017-01-01 PROCEDURE — 87086 URINE CULTURE/COLONY COUNT: CPT

## 2017-01-01 PROCEDURE — 36415 COLL VENOUS BLD VENIPUNCTURE: CPT

## 2017-01-01 PROCEDURE — 85730 THROMBOPLASTIN TIME PARTIAL: CPT

## 2017-01-01 RX ORDER — MAGNESIUM SULFATE HEPTAHYDRATE 40 MG/ML
4 INJECTION, SOLUTION INTRAVENOUS
Status: DISCONTINUED | OUTPATIENT
Start: 2017-01-01 | End: 2017-01-01

## 2017-01-01 RX ORDER — POTASSIUM CHLORIDE 14.9 MG/ML
20 INJECTION INTRAVENOUS ONCE
Status: COMPLETED | OUTPATIENT
Start: 2017-01-01 | End: 2017-01-01

## 2017-01-01 RX ORDER — GLUCAGON 1 MG
1 KIT INJECTION
Status: DISCONTINUED | OUTPATIENT
Start: 2017-01-01 | End: 2017-01-01 | Stop reason: HOSPADM

## 2017-01-01 RX ORDER — CHLORHEXIDINE GLUCONATE ORAL RINSE 1.2 MG/ML
15 SOLUTION DENTAL 2 TIMES DAILY
Status: DISCONTINUED | OUTPATIENT
Start: 2017-01-01 | End: 2017-01-01 | Stop reason: HOSPADM

## 2017-01-01 RX ORDER — MAGNESIUM SULFATE HEPTAHYDRATE 40 MG/ML
2 INJECTION, SOLUTION INTRAVENOUS ONCE
Status: COMPLETED | OUTPATIENT
Start: 2017-01-01 | End: 2017-01-01

## 2017-01-01 RX ORDER — HEPARIN SODIUM 5000 [USP'U]/ML
5000 INJECTION, SOLUTION INTRAVENOUS; SUBCUTANEOUS EVERY 12 HOURS
Status: DISCONTINUED | OUTPATIENT
Start: 2017-01-01 | End: 2017-01-01

## 2017-01-01 RX ORDER — MAGNESIUM SULFATE HEPTAHYDRATE 40 MG/ML
2 INJECTION, SOLUTION INTRAVENOUS
Status: DISCONTINUED | OUTPATIENT
Start: 2017-01-01 | End: 2017-01-01

## 2017-01-01 RX ORDER — BUSPIRONE HYDROCHLORIDE 7.5 MG/1
30 TABLET ORAL ONCE
Status: CANCELLED | OUTPATIENT
Start: 2017-01-01 | End: 2017-01-01

## 2017-01-01 RX ORDER — INSULIN ASPART 100 [IU]/ML
0-5 INJECTION, SOLUTION INTRAVENOUS; SUBCUTANEOUS EVERY 6 HOURS PRN
Status: DISCONTINUED | OUTPATIENT
Start: 2017-01-01 | End: 2017-01-01

## 2017-01-01 RX ORDER — SODIUM CHLORIDE 9 MG/ML
INJECTION, SOLUTION INTRAVENOUS CONTINUOUS
Status: DISCONTINUED | OUTPATIENT
Start: 2017-01-01 | End: 2017-01-01

## 2017-01-01 RX ORDER — FAMOTIDINE 10 MG/ML
20 INJECTION INTRAVENOUS 2 TIMES DAILY
Status: DISCONTINUED | OUTPATIENT
Start: 2017-01-01 | End: 2017-01-01

## 2017-01-01 RX ORDER — NOREPINEPHRINE BITARTRATE 1 MG/ML
INJECTION, SOLUTION INTRAVENOUS
Status: DISPENSED
Start: 2017-01-01 | End: 2017-01-01

## 2017-01-01 RX ORDER — PANTOPRAZOLE SODIUM 40 MG/10ML
40 INJECTION, POWDER, LYOPHILIZED, FOR SOLUTION INTRAVENOUS 2 TIMES DAILY
Status: DISCONTINUED | OUTPATIENT
Start: 2017-01-01 | End: 2017-01-01 | Stop reason: HOSPADM

## 2017-01-01 RX ORDER — BUSPIRONE HYDROCHLORIDE 7.5 MG/1
30 TABLET ORAL ONCE
Status: DISCONTINUED | OUTPATIENT
Start: 2017-01-01 | End: 2017-01-01

## 2017-01-01 RX ORDER — ACETAMINOPHEN 650 MG/20.3ML
650 LIQUID ORAL EVERY 6 HOURS
Status: DISCONTINUED | OUTPATIENT
Start: 2017-01-01 | End: 2017-01-01

## 2017-01-01 RX ORDER — POTASSIUM CHLORIDE 14.9 MG/ML
40 INJECTION INTRAVENOUS
Status: DISCONTINUED | OUTPATIENT
Start: 2017-01-01 | End: 2017-01-01

## 2017-01-01 RX ORDER — INSULIN ASPART 100 [IU]/ML
1-10 INJECTION, SOLUTION INTRAVENOUS; SUBCUTANEOUS EVERY 6 HOURS PRN
Status: DISCONTINUED | OUTPATIENT
Start: 2017-01-01 | End: 2017-01-01 | Stop reason: HOSPADM

## 2017-01-01 RX ORDER — SODIUM CHLORIDE 0.9 % (FLUSH) 0.9 %
3 SYRINGE (ML) INJECTION
Status: DISCONTINUED | OUTPATIENT
Start: 2017-01-01 | End: 2017-01-01 | Stop reason: HOSPADM

## 2017-01-01 RX ORDER — POTASSIUM CHLORIDE 14.9 MG/ML
80 INJECTION INTRAVENOUS
Status: DISCONTINUED | OUTPATIENT
Start: 2017-01-01 | End: 2017-01-01

## 2017-01-01 RX ORDER — POTASSIUM CHLORIDE 14.9 MG/ML
60 INJECTION INTRAVENOUS
Status: DISCONTINUED | OUTPATIENT
Start: 2017-01-01 | End: 2017-01-01

## 2017-01-01 RX ORDER — ENOXAPARIN SODIUM 100 MG/ML
40 INJECTION SUBCUTANEOUS EVERY 24 HOURS
Status: DISCONTINUED | OUTPATIENT
Start: 2017-01-01 | End: 2017-01-01 | Stop reason: HOSPADM

## 2017-01-01 RX ORDER — ACETAMINOPHEN 650 MG/20.3ML
650 LIQUID ORAL EVERY 6 HOURS
Status: CANCELLED | OUTPATIENT
Start: 2017-01-01

## 2017-01-01 RX ORDER — IPRATROPIUM BROMIDE AND ALBUTEROL SULFATE 2.5; .5 MG/3ML; MG/3ML
3 SOLUTION RESPIRATORY (INHALATION) EVERY 4 HOURS
Status: DISCONTINUED | OUTPATIENT
Start: 2017-01-01 | End: 2017-01-01

## 2017-01-01 RX ORDER — MAGNESIUM SULFATE HEPTAHYDRATE 40 MG/ML
2 INJECTION, SOLUTION INTRAVENOUS
Status: CANCELLED | OUTPATIENT
Start: 2017-01-01

## 2017-01-01 RX ADMIN — CHLORHEXIDINE GLUCONATE 15 ML: 1.2 RINSE ORAL at 09:12

## 2017-01-01 RX ADMIN — IPRATROPIUM BROMIDE AND ALBUTEROL SULFATE 3 ML: .5; 3 SOLUTION RESPIRATORY (INHALATION) at 04:12

## 2017-01-01 RX ADMIN — INSULIN ASPART 3 UNITS: 100 INJECTION, SOLUTION INTRAVENOUS; SUBCUTANEOUS at 01:12

## 2017-01-01 RX ADMIN — IPRATROPIUM BROMIDE AND ALBUTEROL SULFATE 3 ML: .5; 3 SOLUTION RESPIRATORY (INHALATION) at 11:12

## 2017-01-01 RX ADMIN — SODIUM CHLORIDE: 0.9 INJECTION, SOLUTION INTRAVENOUS at 04:12

## 2017-01-01 RX ADMIN — INSULIN ASPART 3 UNITS: 100 INJECTION, SOLUTION INTRAVENOUS; SUBCUTANEOUS at 05:12

## 2017-01-01 RX ADMIN — CHLORHEXIDINE GLUCONATE 15 ML: 1.2 RINSE ORAL at 08:12

## 2017-01-01 RX ADMIN — INSULIN ASPART 2 UNITS: 100 INJECTION, SOLUTION INTRAVENOUS; SUBCUTANEOUS at 11:12

## 2017-01-01 RX ADMIN — IPRATROPIUM BROMIDE AND ALBUTEROL SULFATE 3 ML: .5; 3 SOLUTION RESPIRATORY (INHALATION) at 07:12

## 2017-01-01 RX ADMIN — SODIUM CHLORIDE: 0.9 INJECTION, SOLUTION INTRAVENOUS at 02:12

## 2017-01-01 RX ADMIN — POTASSIUM CHLORIDE 20 MEQ: 200 INJECTION, SOLUTION INTRAVENOUS at 04:12

## 2017-01-01 RX ADMIN — PANTOPRAZOLE SODIUM 40 MG: 40 INJECTION, POWDER, FOR SOLUTION INTRAVENOUS at 08:12

## 2017-01-01 RX ADMIN — DEXTROSE MONOHYDRATE 0.05 MCG/KG/MIN: 5 INJECTION, SOLUTION INTRAVENOUS at 05:12

## 2017-01-01 RX ADMIN — IPRATROPIUM BROMIDE AND ALBUTEROL SULFATE 3 ML: .5; 3 SOLUTION RESPIRATORY (INHALATION) at 03:12

## 2017-01-01 RX ADMIN — ACETAMINOPHEN 650 MG: 650 SOLUTION ORAL at 11:12

## 2017-01-01 RX ADMIN — INSULIN ASPART 5 UNITS: 100 INJECTION, SOLUTION INTRAVENOUS; SUBCUTANEOUS at 04:12

## 2017-01-01 RX ADMIN — SODIUM CHLORIDE 6 UNITS/HR: 9 INJECTION, SOLUTION INTRAVENOUS at 12:12

## 2017-01-01 RX ADMIN — PANTOPRAZOLE SODIUM 40 MG: 40 INJECTION, POWDER, FOR SOLUTION INTRAVENOUS at 10:12

## 2017-01-01 RX ADMIN — MAGNESIUM SULFATE IN WATER 2 G: 40 INJECTION, SOLUTION INTRAVENOUS at 08:12

## 2017-01-01 RX ADMIN — ENOXAPARIN SODIUM 40 MG: 100 INJECTION SUBCUTANEOUS at 05:12

## 2017-01-01 RX ADMIN — METHYLPREDNISOLONE SODIUM SUCCINATE 40 MG: 40 INJECTION, POWDER, FOR SOLUTION INTRAMUSCULAR; INTRAVENOUS at 08:12

## 2017-01-01 RX ADMIN — VANCOMYCIN HYDROCHLORIDE 1750 MG: 10 INJECTION, POWDER, LYOPHILIZED, FOR SOLUTION INTRAVENOUS at 09:12

## 2017-01-01 RX ADMIN — ENOXAPARIN SODIUM 40 MG: 100 INJECTION SUBCUTANEOUS at 06:12

## 2017-01-01 RX ADMIN — PANTOPRAZOLE SODIUM 40 MG: 40 INJECTION, POWDER, FOR SOLUTION INTRAVENOUS at 09:12

## 2017-01-01 RX ADMIN — PIPERACILLIN AND TAZOBACTAM 4.5 G: 4; .5 INJECTION, POWDER, LYOPHILIZED, FOR SOLUTION INTRAVENOUS; PARENTERAL at 04:12

## 2017-01-01 RX ADMIN — INSULIN ASPART 1 UNITS: 100 INJECTION, SOLUTION INTRAVENOUS; SUBCUTANEOUS at 09:12

## 2017-01-01 RX ADMIN — DEXTROSE MONOHYDRATE 0.26 MCG/KG/MIN: 5 INJECTION, SOLUTION INTRAVENOUS at 12:12

## 2017-01-01 RX ADMIN — CHLORHEXIDINE GLUCONATE 15 ML: 1.2 RINSE ORAL at 10:12

## 2017-01-01 RX ADMIN — SODIUM CHLORIDE: 9 INJECTION, SOLUTION INTRAVENOUS at 04:12

## 2017-01-01 RX ADMIN — PIPERACILLIN AND TAZOBACTAM 4.5 G: 4; .5 INJECTION, POWDER, LYOPHILIZED, FOR SOLUTION INTRAVENOUS; PARENTERAL at 12:12

## 2017-01-01 RX ADMIN — SODIUM CHLORIDE: 0.9 INJECTION, SOLUTION INTRAVENOUS at 03:12

## 2017-01-01 RX ADMIN — SODIUM CHLORIDE: 0.9 INJECTION, SOLUTION INTRAVENOUS at 08:12

## 2017-01-01 RX ADMIN — ACETAMINOPHEN 650 MG: 650 SOLUTION ORAL at 05:12

## 2017-01-01 RX ADMIN — SODIUM CHLORIDE 2.5 UNITS/HR: 9 INJECTION, SOLUTION INTRAVENOUS at 06:12

## 2017-01-01 RX ADMIN — INSULIN DETEMIR 28 UNITS: 100 INJECTION, SOLUTION SUBCUTANEOUS at 09:12

## 2017-01-01 RX ADMIN — METHYLPREDNISOLONE SODIUM SUCCINATE 40 MG: 40 INJECTION, POWDER, FOR SOLUTION INTRAMUSCULAR; INTRAVENOUS at 01:12

## 2017-01-01 RX ADMIN — SODIUM CHLORIDE: 0.9 INJECTION, SOLUTION INTRAVENOUS at 05:12

## 2017-01-01 RX ADMIN — SODIUM CHLORIDE: 9 INJECTION, SOLUTION INTRAVENOUS at 08:12

## 2017-01-01 RX ADMIN — INSULIN ASPART 2 UNITS: 100 INJECTION, SOLUTION INTRAVENOUS; SUBCUTANEOUS at 05:12

## 2017-01-01 RX ADMIN — DEXTROSE MONOHYDRATE 0.2 MCG/KG/MIN: 5 INJECTION, SOLUTION INTRAVENOUS at 03:12

## 2017-01-01 RX ADMIN — SODIUM CHLORIDE: 0.9 INJECTION, SOLUTION INTRAVENOUS at 07:12

## 2017-01-01 RX ADMIN — SODIUM CHLORIDE: 9 INJECTION, SOLUTION INTRAVENOUS at 12:12

## 2017-01-01 RX ADMIN — DEXTROSE MONOHYDRATE 0.25 MCG/KG/MIN: 5 INJECTION, SOLUTION INTRAVENOUS at 07:12

## 2017-01-01 RX ADMIN — VANCOMYCIN HYDROCHLORIDE 1750 MG: 10 INJECTION, POWDER, LYOPHILIZED, FOR SOLUTION INTRAVENOUS at 10:12

## 2017-01-01 RX ADMIN — SODIUM CHLORIDE 4 UNITS/HR: 9 INJECTION, SOLUTION INTRAVENOUS at 11:12

## 2017-01-01 RX ADMIN — POTASSIUM CHLORIDE 60 MEQ: 200 INJECTION, SOLUTION INTRAVENOUS at 08:12

## 2017-01-01 RX ADMIN — SODIUM CHLORIDE: 0.9 INJECTION, SOLUTION INTRAVENOUS at 12:12

## 2017-01-01 RX ADMIN — PIPERACILLIN AND TAZOBACTAM 4.5 G: 4; .5 INJECTION, POWDER, LYOPHILIZED, FOR SOLUTION INTRAVENOUS; PARENTERAL at 08:12

## 2017-01-01 RX ADMIN — INSULIN ASPART 10 UNITS: 100 INJECTION, SOLUTION INTRAVENOUS; SUBCUTANEOUS at 10:12

## 2017-12-18 PROBLEM — R73.9 HYPERGLYCEMIA: Status: ACTIVE | Noted: 2017-01-01

## 2017-12-18 PROBLEM — D72.829 LEUKOCYTOSIS: Status: ACTIVE | Noted: 2017-01-01

## 2017-12-18 PROBLEM — I46.9 SUDDEN CARDIAC ARREST: Status: ACTIVE | Noted: 2017-01-01

## 2017-12-18 PROBLEM — I46.9 CARDIAC ARREST: Status: ACTIVE | Noted: 2017-01-01

## 2017-12-18 NOTE — ASSESSMENT & PLAN NOTE
- intubated and on ventilator with FIO2 of 50, tidal vol 500, @ 14 respirations/min  - consult PCC

## 2017-12-18 NOTE — ASSESSMENT & PLAN NOTE
- glucose in ED of 464  - glucose upon arrival to ICU of 233  - IV fluids at 125 cc/hour   - no apparent DKA at this time   - Patient NPO with SSRI for NPO patients   - unclear when patient takes long acting insulin; ordered for PM administration   - monitor

## 2017-12-18 NOTE — PLAN OF CARE
Problem: Patient Care Overview  Goal: Plan of Care Review  Outcome: Ongoing (interventions implemented as appropriate)  Recommendation/Intervention:   1. If pt remains intubated, recommend to initiate enteral nutrition within 48h. Recommend Diabetisource AC @ goal rate of 60ml/hr. Initiate @ 20/hr, increase by 20ml q8h until goal or as tolerated. Provides 1728 kcal (97% EEN), 86 gm protein (102% EPN), 1181ml free water, and 115% RDIs.   2. If pt is extubated and diet able to be advanced, recommend DM 1800 kcal and cardiac diet, texture per SLP.      Goals:   1. Meet >85% of estimated nutritional needs   2. Prevent >2% wt loss x 1 week   3. Promote nutrition related labs WNL     See RD note 12/18/2017 for full recs     Corie Milian, MS, RD, LDN   Dietitian, [facility]  [phone]

## 2017-12-18 NOTE — ASSESSMENT & PLAN NOTE
- unclear if exacerbation occuring prior to arrest  - follows with pulmonology: Dr. Ventura  - as of 1/2017 she was using 2L home O2

## 2017-12-18 NOTE — ASSESSMENT & PLAN NOTE
- lipid panel ordered for AM   - last Lipid panel:   Results for JIGAR JOHNSON (MRN [id number]) as of 12/18/2017 02:09   Ref. Range 9/4/2016 04:43   Cholesterol Latest Ref Range: 120 - 199 mg/dL 154   HDL Latest Ref Range: 40 - 75 mg/dL 43   LDL Cholesterol Latest Ref Range: 63.0 - 159.0 mg/dL 99.2   Total Cholesterol/HDL Ratio Latest Ref Range: 2.0 - 5.0  3.6   Triglycerides Latest Ref Range: 30 - 150 mg/dL 59

## 2017-12-18 NOTE — ASSESSMENT & PLAN NOTE
Nutrition Diagnosis:  Inadequate oral intake    Related to (etiology):   intubation    Signs and Symptoms (as evidenced by):   Pt intubated and NPO without nutrition support     Interventions/Recommendations (treatment strategy):  Recommend to initiate enteral nutrition if pt unable to be extubated; See RD note 12/18/2017 for full recs     Nutrition Diagnosis Status:   New

## 2017-12-18 NOTE — PLAN OF CARE
Problem: Patient Care Overview  Goal: Plan of Care Review  Outcome: Ongoing (interventions implemented as appropriate)  Pt remains on [facility] vent with documented settings. Pt remains stable. Oral care done. Will continue to monitor. EKG done.

## 2017-12-18 NOTE — CONSULTS
Pulmonary & Critical Care Medicine   Consultation Note    Reason for Consultation: cardiac arrest    HPI: Ms. Sharmila Campos is a 78 y/o female, with PMH of COPD, HFpEF, HTN, HLD, IDDM, CVA, MI, who presented to [facility] on 12/17/17 after experiencing an episode of cardiac arrest.  The arrest occured while she was completing a breathing treatment in her home. Bystanders preformed CPR for approx. 20-40 minutes beginning at 1951. Another 1 hour of CPR was done in the ED with eventual ROSC. Follows with Dr. Ventura for COPD.      Past Medical History:   Diagnosis Date    COPD (chronic obstructive pulmonary disease)     Depression     Diabetes mellitus     Heart attack     High cholesterol     Hypertension     Stroke      Past Surgical History:   Procedure Laterality Date    CHOLECYSTECTOMY       Social History:   Social History     Social History    Marital status:      Spouse name: N/A    Number of children: N/A    Years of education: N/A     Occupational History    Not on file.     Social History Main Topics    Smoking status: Former Smoker     Quit date: 11/12/2006    Smokeless tobacco: Not on file    Alcohol use No    Drug use: No    Sexual activity: No     Other Topics Concern    Not on file     Social History Narrative    No narrative on file     Family History   Problem Relation Age of Onset    Heart disease Mother     Cancer Sister     Cancer Son     Drug abuse Son      Drug Allergies:   Review of patient's allergies indicates:  No Known Allergies  Current Infusions:   sodium chloride 0.9% 100 mL/hr at 12/18/17 0300    norepinephrine bitartrate-D5W 0.02 mcg/kg/min (12/18/17 0615)     Scheduled Medications:     acetaminophen  650 mg Per NG tube Q6H    busPIRone  30 mg Per NG tube Once    chlorhexidine  15 mL Mouth/Throat BID    insulin detemir  28 Units Subcutaneous QHS    pantoprazole  40 mg Intravenous BID    piperacillin-tazobactam 4.5 g in dextrose  5 % 100 mL IVPB (ready to mix system)  4.5 g Intravenous Q8H    vancomycin (VANCOCIN) IVPB  15 mg/kg Intravenous Q12H     PRN Medications:   calcium chloride IVPB, calcium chloride IVPB, calcium chloride IVPB, dextrose 50%, glucagon (human recombinant), insulin aspart, magnesium sulfate IVPB, magnesium sulfate IVPB, magnesium sulfate IVPB, potassium chloride **AND** potassium chloride **AND** potassium chloride, sodium chloride 0.9%, sodium phosphate IVPB, sodium phosphate IVPB, sodium phosphate IVPB    Review of Systems:   A comprehensive 12-point review of systems was performed, and is negative except for those items mentioned above in the HPI section of this note.     Vital Signs:    Vitals:    12/18/17 0659   BP:    Pulse: 72   Resp: (!) 22   Temp: 96.4 °F (35.8 °C)       Fluid Balance:     Intake/Output Summary (Last 24 hours) at 12/18/17 0717  Last data filed at 12/18/17 0600   Gross per 24 hour   Intake              100 ml   Output               80 ml   Net               20 ml       Physical Exam:   General: NAD, intubated  HEENT: AT/NC, PERRL, EOMI, nasal and oral mucosa moist. ETT in place  Neck: Supple without JVD. Trachea midline. Thyroid feels normal.   Cardiac: RRR with no MRG with brisk cap refill and symmetric pulses in distal extremities.  Respiratory: Normal inspection. Symmetric chest rise.  Auscultation coarse bilaterally with wheezing bilaterally. No increased work of breathing noted.   Abdomen: Soft, NT/ND. +BS.   Extremities: Normal strength and tone (grossly). No visible atrophy. No clubbing or cyanosis on nail exam.   Skin: Warm and dry without visible rash. Femoral CVL in place on the right.  Neuro: No Gag, no corneal reflex, breathing above the vent, pupils non-reactive and small but not pinpoint, not responsive to pain but does move left fingers randomly    Personal Review and Summary of Prior Diagnostics    Laboratory Studies:     Recent Labs  Lab 12/18/17  4468   PH 7.213*   PCO2 58.4*    PO2 82   HCO3 23.5*   POCSATURATED 93*   BE -4       Recent Labs  Lab 12/18/17 0313   WBC 13.72*   RBC 4.51   HGB 12.5   HCT 40.5   *   MCV 90   MCH 27.7   MCHC 30.9*       Recent Labs  Lab 12/18/17 0313      K 4.5      CO2 24   BUN 22   CREATININE 1.3   MG 1.6       Microbiology Data:   Microbiology Results (last 7 days)     Procedure Component Value Units Date/Time    Urine culture [[id number]] Collected:  12/18/17 0535    Order Status:  Sent Specimen:  Urine from Urine, Catheterized Updated:  12/18/17 0535    Culture, Respiratory with Gram Stain [[id number]]     Order Status:  No result Specimen:  Respiratory from Sputum     Blood culture [[id number]]     Order Status:  Sent Specimen:  Blood     Blood culture [[id number]]     Order Status:  Sent Specimen:  Blood         Summary of Chest Imaging Personally Reviewed:   CXR without consolidation though blunting of right costophrenic angle which could represent effusion    2D Echo:  2014: DD and moderate MR    Impression & Recommendations     Ms. Sharmila Campos is a 76 y/o female, with PMH of COPD, HFpEF, HTN, HLD, IDDM, CVA, MI here s/p cardiac arrest (unknown rhythms) due to presumed hypoxic respiratory failure secondary to COPD exacerbation though cause still to be evaluated further with ROSC > 1 hour    Neuro- limited exam though it is quite early to say how this will evolve with time. Will need CT head tomorrow once cooling protocol done. Full re-assessment of neuro status at 72 hours. No gross evidence of seizing.     CV-  on minimal levophed now (maintain MAP >65) while hypothermia protocol underway with goal temp of 36 C. Unable to get A-line so use cuff pressure to titrate. Troponins rising so trend until peak noted. Cardiology consulted. Echo being done now. CK elevated, will trend and if rising will start IVF. BNP normal. Hypothermia protocol initiated and will re-warm tomorrow.     Resp- Minimal vent settings though with still with a  slight respiratory acidosis on recent gas; RR increased on vent; LTVV 6 cc/kg on 40%/5 PEEP. Wheezing on exam so duo-nebs q 4 and steroids due to underlying COPD. Need to consider CTA to rule out PE though would not empirically treat with anti-coagulation until CT head done to rule out a bleed.    Heme/ID-  agree with broad spectrum Abx and cultures, procalcitonin elevated at 13. Lactic acid q 4 with improvement from 3.6 to 2.7.    PPx- PPI + lovenox    Spoke to daughter on the phone today and updated her on clinical status. Answered all questions. Phone number [phone] (home) and cell [phone]- [place].   Estef[place]- [phone]- [place].    Alley Bright M.D.  [facility]monary/Critical [facility] Fellow

## 2017-12-18 NOTE — ASSESSMENT & PLAN NOTE
- last ECHO on file: 11/17/14 with diastolic dysfunction; ECHO ordered upon admission 9/4/16 without available results   - order new ECHO for AM

## 2017-12-18 NOTE — PLAN OF CARE
Problem: Patient Care Overview  Goal: Plan of Care Review  Pt received at GCS of 5. BP of 84/52. Levophed drip titrated accordingly. Initiated hypothermia protocol. Desired still not achieved. UO of less than 30 ml for 2 hours. eICU informed. Will continue monitoring.

## 2017-12-18 NOTE — SUBJECTIVE & OBJECTIVE
Past Medical History:   Diagnosis Date    COPD (chronic obstructive pulmonary disease)     Depression     Diabetes mellitus     Heart attack     High cholesterol     Hypertension     Stroke        Past Surgical History:   Procedure Laterality Date    CHOLECYSTECTOMY         Review of patient's allergies indicates:  No Known Allergies    No current facility-administered medications on file prior to encounter.      Current Outpatient Prescriptions on File Prior to Encounter   Medication Sig    albuterol (PROAIR HFA) 90 mcg/actuation inhaler Inhale 2 puffs into the lungs every 4 (four) hours as needed for Wheezing or Shortness of Breath.    albuterol-ipratropium 2.5mg-0.5mg/3mL (DUO-NEB) 0.5 mg-3 mg(2.5 mg base)/3 mL nebulizer solution Take 3 mLs by nebulization every 4 (four) hours.    aspirin 81 MG Chew Take 1 tablet (81 mg total) by mouth once daily.    budesonide-formoterol 160-4.5 mcg (SYMBICORT) 160-4.5 mcg/actuation HFAA inhale 2 puffs by mouth every 12 hours    gabapentin (NEURONTIN) 300 MG capsule Take 1 capsule (300 mg total) by mouth 3 (three) times daily.    insulin glargine (LANTUS) 100 unit/mL injection Inject 40 Units into the skin once daily.    insulin syringe-needle U-100 (BD INSULIN SYRINGE ULTRA-FINE) 0.5 mL 31 gauge x 5/16 Syrg Inject 1 each into the skin 2 hours after meals and at bedtime.    pravastatin (PRAVACHOL) 20 MG tablet Take 1 tablet (20 mg total) by mouth once daily.    ramipril (ALTACE) 5 MG capsule Take 1 capsule (5 mg total) by mouth once daily.    sertraline (ZOLOFT) 25 MG tablet Take 1 tablet (25 mg total) by mouth every evening.     Family History     Problem Relation (Age of Onset)    Cancer Sister, Son    Drug abuse Son    Heart disease Mother        Social History Main Topics    Smoking status: Former Smoker     Quit date: 11/12/2006    Smokeless tobacco: Not on file    Alcohol use No    Drug use: No    Sexual activity: No     Review of Systems   Unable to  perform ROS: Acuity of condition     Objective:     Vital Signs (Most Recent):  Temp: (!) 95.7 °F (35.4 °C) (12/18/17 0339)  Pulse: 84 (12/18/17 0339)  Resp: (!) 25 (12/18/17 0339)  BP: (!) 84/52 (12/18/17 0339)  SpO2: 100 % (12/18/17 0339) Vital Signs (24h Range):  Temp:  [95.7 °F (35.4 °C)] 95.7 °F (35.4 °C)  Pulse:  [84-86] 84  Resp:  [14-25] 25  SpO2:  [100 %] 100 %  BP: ()/(52-64) 84/52        There is no height or weight on file to calculate BMI.    Physical Exam   Constitutional: She appears well-developed and well-nourished.   Obese, non-responsive female.   Femoral line in right groin.   IO line right proximal anterior tibial.     HENT:   Head: Normocephalic and atraumatic.   Fasciculations of bilateral sides of tongue noted. No gag reflex elicited.    Eyes: Conjunctivae are normal. No scleral icterus.   Pupils are dilated to 3 cm, non-reactive to light. No corneal reflexes present.    Neck: Normal range of motion. Neck supple. No tracheal deviation present.   Cardiovascular: Normal rate, regular rhythm, normal heart sounds and intact distal pulses.  Exam reveals no gallop and no friction rub.    No murmur heard.  Weak distal radial/PT pulses. No carotid bruits.     Pulmonary/Chest: Breath sounds normal. No stridor. She has no wheezes. She has no rales.   Examined while intubated & on ventilator.    Abdominal: Soft. She exhibits distension (with increased tmypanny throughout). She exhibits no mass. There is no tenderness. There is no guarding.   Hypoactive bowel sounds. Orange material suctioned from NG tube.    Neurological: She is unresponsive. A cranial nerve deficit is present. She exhibits abnormal muscle tone. She displays no seizure activity. GCS eye subscore is 1. GCS verbal subscore is 1. GCS motor subscore is 2.   Patient is exhibiting decerebrate posturing with extension of arms and legs, flexion at wrists. No movement when testing for Babinski reflex.    Skin: Skin is warm and dry. No rash  noted. She is not diaphoretic. No erythema. No pallor.   Erythematous rash under bilateral breasts. There is skin burn from AED pad under left breast. Skin appears mildly dusky on pedal surface of bilateral feet.    Psychiatric: She has a normal mood and affect. Her behavior is normal. Judgment and thought content normal.   Nursing note and vitals reviewed.          Significant Labs:   BMP: No results for input(s): GLU, NA, K, CL, CO2, BUN, CREATININE, CALCIUM, MG in the last 48 hours.  CBC:   Recent Labs  Lab 12/18/17  0313   WBC 13.72*   HGB 12.5   HCT 40.5   *     BMP  Lab Results   Component Value Date     12/18/2017    K 4.5 12/18/2017     12/18/2017    CO2 24 12/18/2017    BUN 22 12/18/2017    CREATININE 1.3 12/18/2017    CALCIUM 7.8 (L) 12/18/2017    ANIONGAP 9 12/18/2017    ESTGFRAFRICA 46 (A) 12/18/2017    EGFRNONAA 40 (A) 12/18/2017     CMP: No results for input(s): NA, K, CL, CO2, GLU, BUN, CREATININE, CALCIUM, PROT, ALBUMIN, BILITOT, ALKPHOS, AST, ALT, ANIONGAP, EGFRNONAA in the last 48 hours.    Invalid input(s): ESTGFAFRICA  All pertinent labs within the past 24 hours have been reviewed.    Significant Imaging: I have reviewed all pertinent imaging results/findings within the past 24 hours.     Imaging Results          X-ray chest AP portable (In process)                X-Ray Abdomen Portable (In process)

## 2017-12-18 NOTE — ASSESSMENT & PLAN NOTE
- 20 minutes of bystander CPR in field  - 1 hour of EMS guided CPR    - 5 doses of Epinephrine, 1 dose of amiodarone   - CPR for 5 minutes in ED (per paperwork) before NSR resumed    - administration of 1 dose of Epinephrine   - patient on Levofed for pressure support   - hypothermic protocol initiated

## 2017-12-18 NOTE — ASSESSMENT & PLAN NOTE
- WBC count of 16.3 in ED, reduced to   - etiology unclear at this time    - CXR in this facility without active infection   - UA not convincing for UTI  - patient started on vancomycin   - vancomycin panel ordered   - blood cultures drawn at [facility]   - repeat blood cultures drawn   - lactate elevated at [facility] to 5.52   - patient arrived with IV fluids at 250 cc/hour   - reduced iv fluids to 125 cc/hour 2/2 h/o heart failure with EF of 50% > 1 year ago   - repeat lactate measure ordered

## 2017-12-18 NOTE — ASSESSMENT & PLAN NOTE
- 20 minutes of bystander CPR in field  - 1 hour of EMS guided CPR    - 5 doses of Epinephrine, 1 dose of amiodarone   - CPR for 5 minutes in ED (per paperwork) before NSR resumed    - administration of 1 dose of Epinephrine   - Ventilation with O2 sats >94%   - maintain on ventilator   - consult PCC   - manage BP    - avoid SBP<90 by continuing Levofed drip to titrate to MAP of 65   - Cardiac monitoring ordered   -Patient's temp reported to be 95.7F rectally while in ED    - continue hypothermia protocol  - consult cardiology   - patient on Levofed for pressure support   - evaluate for causes with CT head to eval for stroke, and CTA of chest vs. VQ scan to eval for PE (per d/w Dr. Mcneal these can wait until patient is more stable later this AM)

## 2017-12-18 NOTE — PROCEDURES
"Sharmila Campos is a 77 y.o. female patient.    Temp: (!) 95.5 °F (35.3 °C) (12/18/17 1200)  Pulse: 76 (12/18/17 1215)  Resp: (!) 27 (12/18/17 1215)  BP: (!) 113/54 (12/18/17 1200)  SpO2: 100 % (12/18/17 1215)  Weight: 108.8 kg (239 lb 13.8 oz) (12/18/17 0350)  Height: 5' 2" (157.5 cm) (12/18/17 0350)       Arterial Line  Date/Time: 12/18/2017 1:06 PM  Performed by: RADHAMES BRIGHT  Authorized by: RADHAMES BRIGHT   Consent Done: Emergent Situation  Preparation: Patient was prepped and draped in the usual sterile fashion.  Indications: hemodynamic monitoring  Location: left radial  Govind's test normal: yes  Needle gauge: 22  Seldinger technique: Seldinger technique used  Number of attempts: 3  Complications: No  Specimens: No  Implants: No  Post-procedure CMS: normal  Patient tolerance: Patient tolerated the procedure well with no immediate complications  Comments: Procedure aborted after multiple attempts at bilateral radial wrists with unsuccessful catheter placement. Excellent blood return each attempt but catheter did not have a waveform.          Radhames Bright  12/18/2017  "

## 2017-12-18 NOTE — ASSESSMENT & PLAN NOTE
- was found to be hyperglycemic upon ED arrival  Per 1/15/17 H&P:   -Detemir 35 U qhs, and slighting scale   -A1C 9.5 4 months ago  -Blood glucose monitoring

## 2017-12-18 NOTE — H&P
[facility] Hospital Medicine  History & Physical    Patient Name: Sharmila Campos  MRN: [id number]  Admission Date: 12/18/2017  Attending Physician: Flavio Morgan MD   Primary Care Provider: Noah Jimenez MD         Patient information was obtained from past medical records and ER records.     Subjective:     Principal Problem:Sudden cardiac arrest    Chief Complaint: No chief complaint on file.       HPI: Ms. Sharmila Campos is a 78 y/o female, with PMH of COPD, HFpEF, HTN, HLD, IDDM, CVA, MI, who presented to [facility] on 12/17/17 after experiencing an episode of cardiac arrest.  The arrest occured while she was completing a breathing treatment in her home. Bystanders preformed CPR for approx. 20 minutes beginning at 1951. EMS was called to the scene, and CPR with 5 roudns of Epinephrine, 1 dose of atropin, and one dose of Amiodarone were administered via EMS. When she was transported to the ED, and CPR was preformed in the ED for approx. 1 hour where she had another dose of Epinephrine, and a pulse was regained at 2125. She was started on a Levofed drip at 4 mcg/min, and vanocmycin infusion. Initial troponin was reported to be negative. She was intubated while in the ED, and started on a ventilator prior to transport to [facility].    Past Medical History:   Diagnosis Date    COPD (chronic obstructive pulmonary disease)     Depression     Diabetes mellitus     Heart attack     High cholesterol     Hypertension     Stroke        Past Surgical History:   Procedure Laterality Date    CHOLECYSTECTOMY         Review of patient's allergies indicates:  No Known Allergies    No current facility-administered medications on file prior to encounter.      Current Outpatient Prescriptions on File Prior to Encounter   Medication Sig    albuterol (PROAIR HFA) 90 mcg/actuation inhaler Inhale 2 puffs into the lungs every 4 (four) hours as needed for Wheezing or Shortness  of Breath.    albuterol-ipratropium 2.5mg-0.5mg/3mL (DUO-NEB) 0.5 mg-3 mg(2.5 mg base)/3 mL nebulizer solution Take 3 mLs by nebulization every 4 (four) hours.    aspirin 81 MG Chew Take 1 tablet (81 mg total) by mouth once daily.    budesonide-formoterol 160-4.5 mcg (SYMBICORT) 160-4.5 mcg/actuation HFAA inhale 2 puffs by mouth every 12 hours    gabapentin (NEURONTIN) 300 MG capsule Take 1 capsule (300 mg total) by mouth 3 (three) times daily.    insulin glargine (LANTUS) 100 unit/mL injection Inject 40 Units into the skin once daily.    insulin syringe-needle U-100 (BD INSULIN SYRINGE ULTRA-FINE) 0.5 mL 31 gauge x 5/16 Syrg Inject 1 each into the skin 2 hours after meals and at bedtime.    pravastatin (PRAVACHOL) 20 MG tablet Take 1 tablet (20 mg total) by mouth once daily.    ramipril (ALTACE) 5 MG capsule Take 1 capsule (5 mg total) by mouth once daily.    sertraline (ZOLOFT) 25 MG tablet Take 1 tablet (25 mg total) by mouth every evening.     Family History     Problem Relation (Age of Onset)    Cancer Sister, Son    Drug abuse Son    Heart disease Mother        Social History Main Topics    Smoking status: Former Smoker     Quit date: 11/12/2006    Smokeless tobacco: Not on file    Alcohol use No    Drug use: No    Sexual activity: No     Review of Systems   Unable to perform ROS: Acuity of condition     Objective:     Vital Signs (Most Recent):  Temp: (!) 95.7 °F (35.4 °C) (12/18/17 0339)  Pulse: 84 (12/18/17 0339)  Resp: (!) 25 (12/18/17 0339)  BP: (!) 84/52 (12/18/17 0339)  SpO2: 100 % (12/18/17 0339) Vital Signs (24h Range):  Temp:  [95.7 °F (35.4 °C)] 95.7 °F (35.4 °C)  Pulse:  [84-86] 84  Resp:  [14-25] 25  SpO2:  [100 %] 100 %  BP: ()/(52-64) 84/52        There is no height or weight on file to calculate BMI.    Physical Exam   Constitutional: She appears well-developed and well-nourished.   Obese, non-responsive female.   Femoral line in right groin.   IO line right proximal  anterior tibial.     HENT:   Head: Normocephalic and atraumatic.   Fasciculations of bilateral sides of tongue noted. No gag reflex elicited.    Eyes: Conjunctivae are normal. No scleral icterus.   Pupils are dilated to 3 cm, non-reactive to light. No corneal reflexes present.    Neck: Normal range of motion. Neck supple. No tracheal deviation present.   Cardiovascular: Normal rate, regular rhythm, normal heart sounds and intact distal pulses.  Exam reveals no gallop and no friction rub.    No murmur heard.  Weak distal radial/PT pulses. No carotid bruits.     Pulmonary/Chest: Breath sounds normal. No stridor. She has no wheezes. She has no rales.   Examined while intubated & on ventilator.    Abdominal: Soft. She exhibits distension (with increased tmypanny throughout). She exhibits no mass. There is no tenderness. There is no guarding.   Hypoactive bowel sounds. Orange material suctioned from NG tube.    Neurological: She is unresponsive. A cranial nerve deficit is present. She exhibits abnormal muscle tone. She displays no seizure activity. GCS eye subscore is 1. GCS verbal subscore is 1. GCS motor subscore is 2.   Patient is exhibiting decerebrate posturing with extension of arms and legs, flexion at wrists. No movement when testing for Babinski reflex.    Skin: Skin is warm and dry. No rash noted. She is not diaphoretic. No erythema. No pallor.   Erythematous rash under bilateral breasts. There is skin burn from AED pad under left breast. Skin appears mildly dusky on pedal surface of bilateral feet.    Psychiatric: She has a normal mood and affect. Her behavior is normal. Judgment and thought content normal.   Nursing note and vitals reviewed.          Significant Labs:   BMP: No results for input(s): GLU, NA, K, CL, CO2, BUN, CREATININE, CALCIUM, MG in the last 48 hours.  CBC:   Recent Labs  Lab 12/18/17  0313   WBC 13.72*   HGB 12.5   HCT 40.5   *     BMP  Lab Results   Component Value Date      12/18/2017    K 4.5 12/18/2017     12/18/2017    CO2 24 12/18/2017    BUN 22 12/18/2017    CREATININE 1.3 12/18/2017    CALCIUM 7.8 (L) 12/18/2017    ANIONGAP 9 12/18/2017    ESTGFRAFRICA 46 (A) 12/18/2017    EGFRNONAA 40 (A) 12/18/2017     CMP: No results for input(s): NA, K, CL, CO2, GLU, BUN, CREATININE, CALCIUM, PROT, ALBUMIN, BILITOT, ALKPHOS, AST, ALT, ANIONGAP, EGFRNONAA in the last 48 hours.    Invalid input(s): ESTGFAFRICA  All pertinent labs within the past 24 hours have been reviewed.    Significant Imaging: I have reviewed all pertinent imaging results/findings within the past 24 hours.     Imaging Results          X-ray chest AP portable (In process)                X-Ray Abdomen Portable (In process)                  Assessment/Plan:     * Sudden cardiac arrest    - 20 minutes of bystander CPR in field  - 1 hour of EMS guided CPR    - 5 doses of Epinephrine, 1 dose of amiodarone   - CPR for 5 minutes in ED (per paperwork) before NSR resumed    - administration of 1 dose of Epinephrine   - Ventilation with O2 sats >94%   - maintain on ventilator   - consult PCC   - manage BP    - avoid SBP<90 by continuing Levofed drip to titrate to MAP of 65   - Cardiac monitoring ordered   -Patient's temp reported to be 95.7F rectally while in ED    - continue hypothermia protocol  - initial troponin of 0.04 in ED, now elevated     Recent Labs  Lab 12/18/17  0313   TROPONINI 0.271*        Recent Labs  Lab 12/18/17  0313   *  578*   CPKMB 25.9*   TROPONINI 0.271*   MB 4.5     - consult cardiology   - patient on Levofed for pressure support   - evaluate for causes with CT head to eval for stroke, and CTA of chest vs. VQ scan to eval for PE (per d/w Dr. Mcneal these can wait until patient is more stable later this AM)         Acute-on-chronic respiratory failure    - intubated and on ventilator with FIO2 of 50, tidal vol 500, @ 14 respirations/min  - consult PCC        (HFpEF) heart failure with preserved  ejection fraction    - last ECHO on file: 11/17/14 with diastolic dysfunction; ECHO ordered upon admission 9/4/16 without available results   - order new ECHO for AM           Hypertension    - manage as per AHA guidelines and keep SBP > 90        HLD (hyperlipidemia)    - lipid panel ordered for AM   - last Lipid panel:   Results for JIGAR JOHNSON (MRN [id number]) as of 12/18/2017 02:09   Ref. Range 9/4/2016 04:43   Cholesterol Latest Ref Range: 120 - 199 mg/dL 154   HDL Latest Ref Range: 40 - 75 mg/dL 43   LDL Cholesterol Latest Ref Range: 63.0 - 159.0 mg/dL 99.2   Total Cholesterol/HDL Ratio Latest Ref Range: 2.0 - 5.0  3.6   Triglycerides Latest Ref Range: 30 - 150 mg/dL 59           Diabetes mellitus    - was found to be hyperglycemic upon ED arrival  Per 1/15/17 H&P:   -Detemir 35 U qhs, and slighting scale   -A1C 9.5 4 months ago  -Blood glucose monitoring         COPD (chronic obstructive pulmonary disease)    - unclear if exacerbation occuring prior to arrest  - follows with pulmonology: Dr. Ventura  - as of 1/2017 she was using 2L home O2       Leukocytosis  - WBC count of 16.3 in ED   - etiology unclear at this time    - CXR in this facility without active infection   - UA not convincing for UTI  - patient started on vancomycin   - vancomycin panel ordered   - blood cultures drawn at [facility]   - repeat blood cultures drawn   - lactate elevated at [facility] to 5.52   - patient arrived with IV fluids at 250 cc/hour   - reduced iv fluids to 125 cc/hour 2/2 h/o heart failure with EF of 50% > 1 year ago   - repeat lactate measure ordered     Hyperglycemia  - glucose in ED of 464  - glucose upon arrival to ICU of 233  - IV fluids at 125 cc/hour   - no apparent DKA at this time   - Patient NPO with SSRI for NPO patients   - unclear when patient takes long acting insulin; ordered for PM administration   - monitor      VTE Risk Mitigation         Ordered     Medium Risk of VTE  Once      12/18/17 9832      Place HAZEL hose  Until discontinued      12/18/17 0304     Place sequential compression device  Until discontinued      12/18/17 0304             Nayeli Peacock PA-C  Department of Hospital Medicine   [facility]

## 2017-12-18 NOTE — PLAN OF CARE
--------------------------------------    ATTN: TEAM ABHAY PLANNING    ON VENT IN ICU     PER NOTES  IN [facility] CASE      RECOMMENDATION  : SPIRITUAL CARE CONSULT  FOR PT/FAMILY    IF ANY NEEDS ARISE PLEASE CONTACT RN ORQUIDEA /[facility] TEAM      NARCISA BE ON CASE ALSO # [id number]     Zahira Greer RN  Case management 12/18/20171:18 PM  # [phone] (FAX) [phone]        12/18/17 1310   Discharge Assessment   Assessment Type Discharge Planning Reassessment

## 2017-12-18 NOTE — EICU
10:03 Several unsuccessful attempts to place Chaya, now trying again on the left radial. BP continues to read erratic numbers, now 90/42 HR 84. Dr Bright at bedside with nursing staff.

## 2017-12-18 NOTE — EICU
[place]    76 y/o F became unresponsive while completing breathing treatments at home. Bystander CPR initiated and EMS on arrival on scene she received  5 rounds of epi. In the ED she required another dose of epi and a more secure airway was placed.  Was initially hypothermic in the ED.    Currently on stable dose on norepinephrine.    · Continue supportive care  · Targeted temperature therapy  · Empiric antibiotics  · Trend cardiac enzymes, first set negative despite CPR  · Discussed with NP, plan for CTA and CT head to investigate cause of arrest

## 2017-12-18 NOTE — PLAN OF CARE
12/18/17 1242   Discharge Assessment   Assessment Type Discharge Planning Assessment   Assessment information obtained from? Caregiver;Medical Record   Prior to hospitilization cognitive status: Alert/Oriented   Prior to hospitalization functional status: Assistive Equipment   Current cognitive status: Coma/Sedated/Intubated  (on mechanical vent, ICU bed 9)   Current Functional Status: Completely Dependent   Readmission Within The Last 30 Days unable to assess   Patient currently being followed by outpatient case management? Unable to determine (comments)   Do you have any problems affording any of your prescribed medications? TBD   Patient/Family In Agreement With Plan unable to assess

## 2017-12-18 NOTE — EICU
08:30 Called into room to do Time out prior to arterial line placement. Time out completed per protocol-unable to reach family for consent per Dr Bright. Manual BP elevated with levo infusing-felt by staff to be incorrect and reason for A line insertion. HR 75, SP02 92 with poor waveform,

## 2017-12-18 NOTE — ASSESSMENT & PLAN NOTE
- 20 minutes of bystander CPR in field and 1 hour of coding with EMS  - Continue pressor support  - Hypothermia protocol in progress  - Cardiology and critical care following  - EEG ordered  - Prognosis currently very poor.

## 2017-12-18 NOTE — CONSULTS
[facility]  Adult Nutrition  Consult Note    SUMMARY     Recommendations    Recommendation/Intervention:   1. If pt remains intubated, recommend to initiate enteral nutrition within 48h. Recommend Diabetisource AC @ goal rate of 60ml/hr. Initiate @ 20/hr, increase by 20ml q8h until goal or as tolerated. Provides 1728 kcal (97% EEN), 86 gm protein (102% EPN), 1181ml free water, and 115% RDIs.   2. If pt is extubated and diet able to be advanced, recommend DM 1800 kcal and cardiac diet, texture per SLP.     Goals:   1. Meet >85% of estimated nutritional needs   2. Prevent >2% wt loss x 1 week   3. Promote nutrition related labs WNL     Nutrition Goal Status: new  Communication of RD Recs: other (comment) (care plan)    Reason for Assessment    Reason for Assessment: physician consult  Diagnosis:  1. Cardiac arrest    2. Sudden cardiac arrest    3. Chest pain       Past Medical History:   Diagnosis Date    COPD (chronic obstructive pulmonary disease)     Depression     Diabetes mellitus     Heart attack     High cholesterol     Hypertension     Stroke       General Information Comments: Pt is intubated, no family available at time of visit.     Nutrition Discharge Planning: pending medical course    Nutrition Prescription Ordered    Current Diet Order: NPO    Evaluation of Received Nutrients/Fluid Intake     % Intake of Estimated Energy Needs: 0 - 25 %  % Meal Intake: NPO       Nutrition/Diet History    Food Preferences: No cultural/spiritual prefs noted  Factors Affecting Nutritional Intake: NPO, on mechanical ventilation    Labs/Tests/Procedures/Meds     Pertinent Labs Reviewed: reviewed  Lab Results   Component Value Date     12/18/2017    K 4.3 12/18/2017     12/18/2017    CO2 23 12/18/2017    BUN 29 (H) 12/18/2017    CREATININE 1.8 (H) 12/18/2017    CALCIUM 7.6 (L) 12/18/2017    PHOS 2.8 12/18/2017    MG 1.1 (L) 12/18/2017    ESTGFRAFRICA 31 (A) 12/18/2017    EGFRNONAA 27  "(A) 12/18/2017    ALBUMIN 3.8 01/16/2017     Lab Results   Component Value Date    ALT 38 01/16/2017    AST 25 01/16/2017    ALKPHOS 94 01/16/2017     POCT Glucose   Date Value Ref Range Status   12/18/2017 197 (H) 70 - 110 mg/dL Final   12/18/2017 213 (H) 70 - 110 mg/dL Final   12/18/2017 238 (H) 70 - 110 mg/dL Final     Lab Results   Component Value Date    HGBA1C 10.2 (H) 12/18/2017     Lab Results   Component Value Date    HCT 40.5 12/18/2017     Lab Results   Component Value Date    HGB 12.5 12/18/2017      Pertinent Medications Reviewed: reviewed  Scheduled Meds:   acetaminophen  650 mg Per NG tube Q6H    albuterol-ipratropium 2.5mg-0.5mg/3mL  3 mL Nebulization Q4H    busPIRone  30 mg Per NG tube Once    chlorhexidine  15 mL Mouth/Throat BID    enoxaparin  40 mg Subcutaneous Daily    insulin detemir  28 Units Subcutaneous QHS    methylPREDNISolone sodium succinate  40 mg Intravenous Daily    pantoprazole  40 mg Intravenous BID    piperacillin-tazobactam 4.5 g in dextrose 5 % 100 mL IVPB (ready to mix system)  4.5 g Intravenous Q8H    vancomycin (VANCOCIN) IVPB  15 mg/kg Intravenous Q12H     Continuous Infusions:   norepinephrine bitartrate-D5W 0.26 mcg/kg/min (12/18/17 1228)     Physical Findings    Overall Physical Appearance: obese, on ventilator support  Tubes: orogastric tube  Oral/Mouth Cavity: tooth/teeth missing  Skin: intact    Anthropometrics    Temp: (!) 95.5 °F (35.3 °C)  Height: 5' 2" (157.5 cm)  Weight Method: Bed Scale  Weight: 108.8 kg (239 lb 13.8 oz)  Ideal Body Weight (IBW), Female: 110 lb  % Ideal Body Weight, Female (lb): 218.05 lb  BMI (Calculated): 44  BMI Grade: greater than 40 - morbid obesity    Estimated/Assessed Needs    Weight Used For Calorie Calculations: 108.8 kg (239 lb 13.8 oz)   Height (cm): 157.5 cm  Energy Calorie Requirements (kcal): 1790  Energy Need Method: [facility]    Weight Used For Protein Calculations: 49.9 kg (110 lb) (IBW)  Protein " Requirements: 75-85 gm/d (1.5-1.7 gm/kg IBW)    Fluid Requirements (mL): 1790 (or per team)  Fluid Need Method: RDA Method    Assessment and Plan    Acute-on-chronic respiratory failure    Nutrition Diagnosis:  Inadequate oral intake    Related to (etiology):   intubation    Signs and Symptoms (as evidenced by):   Pt intubated and NPO without nutrition support     Interventions/Recommendations (treatment strategy):  Recommend to initiate enteral nutrition if pt unable to be extubated; See RD note 12/18/2017 for full recs     Nutrition Diagnosis Status:   New          Monitor and Evaluation    Food and Nutrient Intake: energy intake, food and beverage intake, enteral nutrition intake  Food and Nutrient Adminstration: diet order, enteral and parenteral nutrition administration  Physical Activity and Function: nutrition-related ADLs and IADLs  Anthropometric Measurements: weight, weight change  Biochemical Data, Medical Tests and Procedures: electrolyte and renal panel, gastrointestinal profile, glucose/endocrine profile, inflammatory profile, lipid profile  Nutrition-Focused Physical Findings: overall appearance, extremities, muscles and bones, skin    Nutrition Risk    Level of Risk: other (see comments) (f/u 2x/wk)    Nutrition Follow-Up    RD Follow-up?: Yes    Corie Milian, MS, RD, LDN   Dietitian, [facility]  [phone]

## 2017-12-19 NOTE — PROGRESS NOTES
[facility] Hospital Medicine  Progress Note    Patient Name: Sharmila Campos  MRN: [id number]  Patient Class: IP- Inpatient   Admission Date: 12/18/2017  Length of Stay: 1 days  Attending Physician: Jeannine Xie MD  Primary Care Provider: Noah Jimenez MD        Subjective:     Principal Problem:Sudden cardiac arrest    HPI:  Ms. Campos is a 77 year old woman who was transferred here from [facility] on 12/17 after an episode of cardiac arrest at home while she was taking a breathing treatment.  Bystanders performed CPR for about 20 minutes and when EMS arrived she continued with CPR, 5 rounds of epinephrine, 1 dose of atropine and a dose of amiodarone.  CPR continued in the ED for about 5 minutes and an additional dose of epinephrine was given before she regained her pulse.  She was started on norepinephrine and vancomycin and transferred to [facility] on the ventilator.    Patient's medical history is notable for COPD, chronic diastolic heart failure, diabetes, HTN and previous stroke and MI.    Hospital Course:  Patient was admitted to ICU and a cooling protocol was initiated.  Her lactic acid level was elevated without a source of infection noted, and she was treated with IV Zosyn presumptively while awaiting cultures.    Interval History:  Patient is new to me.  Cooling period is complete and she is being rewarmed.  She is breathing about 2x/minute over the ventilator and making a small amount of urine, but otherwise is unresponsive on the ventilator without sedation.    Review of Systems   Unable to perform ROS: Intubated     Objective:     Vital Signs (Most Recent):  Temp: (!) 95 °F (35 °C) (12/19/17 1000)  Pulse: 72 (12/19/17 0757)  Resp: (!) 27 (12/19/17 0757)  BP: (!) 95/46 (12/19/17 0645)  SpO2: 100 % (12/19/17 0757) Vital Signs (24h Range):  Temp:  [95 °F (35 °C)-98.1 °F (36.7 °C)] 95 °F (35 °C)  Pulse:  [72-88] 72  Resp:  [22-37] 27  SpO2:  [80 %-100 %] 100 %  BP:  ()/() 95/46     Weight: 108.8 kg (239 lb 13.8 oz)  Body mass index is 43.87 kg/m².    Intake/Output Summary (Last 24 hours) at 12/19/17 1110  Last data filed at 12/19/17 1000   Gross per 24 hour   Intake          1775.16 ml   Output             1295 ml   Net           480.16 ml      Physical Exam   Constitutional: She appears well-developed.   Obese woman unresponsive on ventilator.   HENT:   Head: Normocephalic.   Eyes: Conjunctivae are normal.   Neck: Neck supple. No thyromegaly present.   Cardiovascular: Normal rate, regular rhythm, normal heart sounds and intact distal pulses.  Exam reveals no gallop and no friction rub.    No murmur heard.  Pulmonary/Chest: She has no wheezes. She has no rales.   Breathing unlabored on ventilator.  Coarse ventilator noise appreciated.   Abdominal: Soft. She exhibits distension. There is no tenderness.   Bowel sounds decreased.   Musculoskeletal: She exhibits no edema.   Lymphadenopathy:     She has no cervical adenopathy.   Neurological:   Unresponsive to voice or touch.  Not following commands.  No oculocephalic reflex noted.   Skin: Skin is warm and dry. No rash noted.       Significant Labs:   CBC:   Recent Labs  Lab 12/18/17  0313   WBC 13.72*   HGB 12.5   HCT 40.5   *     CMP:   Recent Labs  Lab 12/19/17  0000 12/19/17  0357 12/19/17  0810   * 131* 133*   K 3.7 3.7 3.4*    100 103   CO2 18* 19* 17*   *  443* 480*  480* 479*  479*   BUN 36* 37* 37*   CREATININE 2.1* 2.3* 2.2*   CALCIUM 7.5* 7.7* 7.1*   ANIONGAP 13 12 13   EGFRNONAA 22* 20* 21*     Lactic Acid:   Recent Labs  Lab 12/18/17  0815 12/18/17  1159 12/18/17  1602   LACTATE 3.6* 2.7*  2.7* 2.6*       Significant Imaging: I have reviewed all pertinent imaging results/findings within the past 24 hours.    Assessment/Plan:      * Sudden cardiac arrest    - 20 minutes of bystander CPR in field and 1 hour of coding with EMS  - Continue pressor support  - Hypothermia protocol in  progress  - Cardiology and critical care following  - EEG ordered  - Prognosis currently very poor.        Acute-on-chronic respiratory failure    - History of COPD on home oxygen  - Intubated and on ventilator   - Pulmonary/critical care following        Type 2 diabetes mellitus with stage 4 chronic kidney disease, with long-term current use of insulin    - Patient hyperglycemic   - She was on detemir 35 U qhs and sliding scale at home  - A1C 10.3  - Continue SSI - Accuchecks are starting to decrease while patient NPO and expect this trend to continue  - Poor renal function at baseline, making urine - continue close monitoring.        Hyperglycemia    - glucose in ED of 464  - glucose upon arrival to ICU of 233  - IV fluids at 125 cc/hour   - no apparent DKA at this time   - Patient NPO with SSRI for NPO patients   - unclear when patient takes long acting insulin; ordered for PM administration   - monitor           Leukocytosis    - WBC count of 16.3 in ED, reduced to   - etiology unclear at this time    - CXR in this facility without active infection   - UA not convincing for UTI  - patient started on vancomycin   - vancomycin panel ordered   - blood cultures drawn at [facility]   - repeat blood cultures drawn   - lactate elevated at [facility] to 5.52   - patient arrived with IV fluids at 250 cc/hour   - reduced iv fluids to 125 cc/hour 2/2 h/o heart failure with EF of 50% > 1 year ago   - repeat lactate measure ordered             HLD (hyperlipidemia)    - lipid panel ordered for AM   - last Lipid panel:   Results for JIGAR JOHNSON (MRN [id number]) as of 12/18/2017 02:09   Ref. Range 9/4/2016 04:43   Cholesterol Latest Ref Range: 120 - 199 mg/dL 154   HDL Latest Ref Range: 40 - 75 mg/dL 43   LDL Cholesterol Latest Ref Range: 63.0 - 159.0 mg/dL 99.2   Total Cholesterol/HDL Ratio Latest Ref Range: 2.0 - 5.0  3.6   Triglycerides Latest Ref Range: 30 - 150 mg/dL 59           (HFpEF) heart failure with  preserved ejection fraction    - last ECHO on file: 11/17/14 with diastolic dysfunction; ECHO ordered upon admission 9/4/16 without available results   - order new ECHO for AM           Essential hypertension    - Currently requiring pressor support.        COPD (chronic obstructive pulmonary disease)    - Cardiac arrest occurred in conjunction with patient giving herself a respiratory treatment  - follows with pulmonology: Dr. Ventura  - as of 1/2017 she was on home oxygen 2 liters            VTE Risk Mitigation         Ordered     enoxaparin injection 40 mg  Daily     Route:  Subcutaneous        12/18/17 1330     Medium Risk of VTE  Once      12/18/17 0304     Place sequential compression device  Until discontinued      12/18/17 0304              Jeannine Wilhelm MD  Department of Hospital Medicine   [facility]

## 2017-12-19 NOTE — PROGRESS NOTES
[facility]  Cardiology  Progress Note    Patient Name: Shamrila Campos  MRN: [id number]  Admission Date: 12/18/2017  Hospital Length of Stay: 1 days  Code Status: Full Code   Attending Physician: Jeannine Xie MD   Primary Care Physician: Noah Jimenez MD  Expected Discharge Date:   Principal Problem:Sudden cardiac arrest    Subjective:     Brief HPI:    Ms. Sharmila Campos is a 78 y/o female with hypertension, hypercholesterolemia and diabetes mellitus type 2. She is obese. According to previous notes she has a history of a myocardial infarction. In addition she has heart failure with well preserved ejection fraction, chronic obstructive pulmonary disease and a history of a cerebrovascular accident.     She  presented to [facility] in [place] on 12/17/17 with cardiopulmonary arrest.  The arrest occured while she was completing a breathing treatment in her home. Bystanders performed CPR for approximately 20 minutes beginning at 19:51. EMS was called to the scene and CPR was continued. She received 5 injections of epinephrine, 1 dose of atropin and a bolus dose of of amiodarone. She was transported to the ED and CPR was performed in the ED for approximately another hour. Pulse was regained at 21:25. She was begun on a norepinephrine infusion at 4 mcg/min. She was transported to [facility] and the hypothermia protocol was initiated. ECG's obtained are overall quite unremarkable in that no significant ST T wave changes are seen.     Hospital Course:    Hypothermia protocol.    Sedated.    Interval History:     Remains sedated on ventilator. No arrhythmias.    Review of Systems   Unable to perform ROS: intubated     Objective:     Vital Signs (Most Recent):  Temp: (!) 95.5 °F (35.3 °C) (12/19/17 0900)  Pulse: 72 (12/19/17 0757)  Resp: (!) 27 (12/19/17 0757)  BP: (!) 95/46 (12/19/17 0645)  SpO2: 100 % (12/19/17 0757) Vital Signs (24h Range):  Temp:  [95.5  °F (35.3 °C)-98.1 °F (36.7 °C)] 95.5 °F (35.3 °C)  Pulse:  [72-88] 72  Resp:  [20-37] 27  SpO2:  [80 %-100 %] 100 %  BP: ()/() 95/46     Weight: 108.8 kg (239 lb 13.8 oz)  Body mass index is 43.87 kg/m².    SpO2: 100 %  O2 Device (Oxygen Therapy): ventilator      Intake/Output Summary (Last 24 hours) at 12/19/17 0959  Last data filed at 12/19/17 0700   Gross per 24 hour   Intake          1775.16 ml   Output             1125 ml   Net           650.16 ml       Lines/Drains/Airways     Central Venous Catheter Line                 Percutaneous Central Line Insertion/Assessment - triple lumen  12/17/17 right femoral 2 days          Drain                 NG/OG Tube 12/17/17 orogastric 2 days         Urethral Catheter 12/17/17 2 days          Airway                 Airway - Non-Surgical 12/17/17 Endotracheal Tube 2 days          Peripheral Intravenous Line                 Peripheral IV - Single Lumen 12/17/17 Right Forearm 2 days                Physical Exam   Constitutional: She is sedated, chemically paralyzed and intubated.   Cardiovascular: Normal rate, regular rhythm, S1 normal and S2 normal.  Exam reveals gallop and S4.    Pulmonary/Chest: She is intubated. She has rhonchi.     Current Medications:     albuterol-ipratropium 2.5mg-0.5mg/3mL  3 mL Nebulization Q4H    chlorhexidine  15 mL Mouth/Throat BID    enoxaparin  40 mg Subcutaneous Daily    insulin detemir  28 Units Subcutaneous QHS    magnesium sulfate IVPB  2 g Intravenous Once    methylPREDNISolone sodium succinate  40 mg Intravenous Daily    pantoprazole  40 mg Intravenous BID    custom IVPB builder   Intravenous Q8H     Current Laboratory Results:    Recent Results (from the past 24 hour(s))   ISTAT PROCEDURE    Collection Time: 12/18/17 10:29 AM   Result Value Ref Range    POC PH 7.253 (LL) 7.35 - 7.45    POC PCO2 51.3 (H) 35 - 45 mmHg    POC PO2 135 (H) 80 - 100 mmHg    POC HCO3 22.7 (L) 24 - 28 mmol/L    POC BE -4 -2 to 2 mmol/L    POC  SATURATED O2 99 95 - 100 %    Rate 26     Sample ARTERIAL     Site LR     Allens Test Pass     DelSys Adult Vent     Mode AC/PRVC     Vt 450     PEEP 5     PiP 54     FiO2 45     ETCO2 35     Min Vol 10.9     Sp02 95    Blood culture    Collection Time: 12/18/17 10:40 AM   Result Value Ref Range    Blood Culture, Routine No Growth to date    Glucose, random (Unless on insulin infusion)    Collection Time: 12/18/17 11:59 AM   Result Value Ref Range    Glucose 253 (H) 70 - 110 mg/dL   Basic metabolic panel    Collection Time: 12/18/17 11:59 AM   Result Value Ref Range    Sodium 136 136 - 145 mmol/L    Potassium 4.3 3.5 - 5.1 mmol/L    Chloride 103 95 - 110 mmol/L    CO2 23 23 - 29 mmol/L    Glucose 253 (H) 70 - 110 mg/dL    BUN, Bld 29 (H) 8 - 23 mg/dL    Creatinine 1.8 (H) 0.5 - 1.4 mg/dL    Calcium 7.6 (L) 8.7 - 10.5 mg/dL    Anion Gap 10 8 - 16 mmol/L    eGFR if African American 31 (A) >60 mL/min/1.73 m^2    eGFR if non African American 27 (A) >60 mL/min/1.73 m^2   Magnesium    Collection Time: 12/18/17 11:59 AM   Result Value Ref Range    Magnesium 1.1 (L) 1.6 - 2.6 mg/dL   Phosphorus    Collection Time: 12/18/17 11:59 AM   Result Value Ref Range    Phosphorus 2.8 2.7 - 4.5 mg/dL   Calcium, ionized    Collection Time: 12/18/17 11:59 AM   Result Value Ref Range    Calcium, Ion 1.07 1.06 - 1.42 mmol/L   Lactic acid, plasma    Collection Time: 12/18/17 11:59 AM   Result Value Ref Range    Lactate (Lactic Acid) 2.7 (H) 0.5 - 2.2 mmol/L   Lactic acid, plasma    Collection Time: 12/18/17 11:59 AM   Result Value Ref Range    Lactate (Lactic Acid) 2.7 (H) 0.5 - 2.2 mmol/L   Troponin I    Collection Time: 12/18/17 11:59 AM   Result Value Ref Range    Troponin I 2.255 (H) 0.000 - 0.026 ng/mL   CK-MB    Collection Time: 12/18/17 11:59 AM   Result Value Ref Range    CPK 3330 (H) 20 - 180 U/L    CPK MB 89.0 (H) 0.1 - 6.5 ng/mL    MB% 2.7 0.0 - 5.0 %   Glucose, random (Unless on insulin infusion)    Collection Time: 12/18/17   4:02 PM   Result Value Ref Range    Glucose 229 (H) 70 - 110 mg/dL   Basic metabolic panel    Collection Time: 12/18/17  4:02 PM   Result Value Ref Range    Sodium 134 (L) 136 - 145 mmol/L    Potassium 4.3 3.5 - 5.1 mmol/L    Chloride 102 95 - 110 mmol/L    CO2 20 (L) 23 - 29 mmol/L    Glucose 229 (H) 70 - 110 mg/dL    BUN, Bld 32 (H) 8 - 23 mg/dL    Creatinine 2.0 (H) 0.5 - 1.4 mg/dL    Calcium 7.7 (L) 8.7 - 10.5 mg/dL    Anion Gap 12 8 - 16 mmol/L    eGFR if African American 27 (A) >60 mL/min/1.73 m^2    eGFR if non African American 24 (A) >60 mL/min/1.73 m^2   Magnesium    Collection Time: 12/18/17  4:02 PM   Result Value Ref Range    Magnesium 1.2 (L) 1.6 - 2.6 mg/dL   Phosphorus    Collection Time: 12/18/17  4:02 PM   Result Value Ref Range    Phosphorus 2.8 2.7 - 4.5 mg/dL   Calcium, ionized    Collection Time: 12/18/17  4:02 PM   Result Value Ref Range    Calcium, Ion 1.04 (L) 1.06 - 1.42 mmol/L   Lactic acid, plasma    Collection Time: 12/18/17  4:02 PM   Result Value Ref Range    Lactate (Lactic Acid) 2.6 (H) 0.5 - 2.2 mmol/L   Culture, Respiratory with Gram Stain    Collection Time: 12/18/17  5:45 PM   Result Value Ref Range    Respiratory Culture No Growth     Gram Stain (Respiratory) <10 epithelial cells per low power field.     Gram Stain (Respiratory) Rare WBC's     Gram Stain (Respiratory) Moderate Gram negative rods     Gram Stain (Respiratory) Rare Gram positive cocci     Gram Stain (Respiratory) Rare Gram negative diplococci    Calcium, ionized    Collection Time: 12/18/17  8:00 PM   Result Value Ref Range    Calcium, Ion 1.01 (L) 1.06 - 1.42 mmol/L   Magnesium    Collection Time: 12/18/17  8:00 PM   Result Value Ref Range    Magnesium 1.1 (L) 1.6 - 2.6 mg/dL   Basic metabolic panel    Collection Time: 12/18/17  8:00 PM   Result Value Ref Range    Sodium 135 (L) 136 - 145 mmol/L    Potassium 3.8 3.5 - 5.1 mmol/L    Chloride 102 95 - 110 mmol/L    CO2 19 (L) 23 - 29 mmol/L    Glucose 277 (H) 70 -  110 mg/dL    BUN, Bld 33 (H) 8 - 23 mg/dL    Creatinine 2.0 (H) 0.5 - 1.4 mg/dL    Calcium 7.7 (L) 8.7 - 10.5 mg/dL    Anion Gap 14 8 - 16 mmol/L    eGFR if African American 27 (A) >60 mL/min/1.73 m^2    eGFR if non African American 24 (A) >60 mL/min/1.73 m^2   Phosphorus    Collection Time: 12/18/17  8:00 PM   Result Value Ref Range    Phosphorus 2.9 2.7 - 4.5 mg/dL   Basic metabolic panel    Collection Time: 12/19/17 12:00 AM   Result Value Ref Range    Sodium 132 (L) 136 - 145 mmol/L    Potassium 3.7 3.5 - 5.1 mmol/L    Chloride 101 95 - 110 mmol/L    CO2 18 (L) 23 - 29 mmol/L    Glucose 443 (H) 70 - 110 mg/dL    BUN, Bld 36 (H) 8 - 23 mg/dL    Creatinine 2.1 (H) 0.5 - 1.4 mg/dL    Calcium 7.5 (L) 8.7 - 10.5 mg/dL    Anion Gap 13 8 - 16 mmol/L    eGFR if African American 26 (A) >60 mL/min/1.73 m^2    eGFR if non African American 22 (A) >60 mL/min/1.73 m^2   Magnesium    Collection Time: 12/19/17 12:00 AM   Result Value Ref Range    Magnesium 1.1 (L) 1.6 - 2.6 mg/dL   Phosphorus    Collection Time: 12/19/17 12:00 AM   Result Value Ref Range    Phosphorus 2.7 2.7 - 4.5 mg/dL   Calcium, ionized    Collection Time: 12/19/17 12:00 AM   Result Value Ref Range    Calcium, Ion 1.01 (L) 1.06 - 1.42 mmol/L   Glucose, random (Unless on insulin infusion)    Collection Time: 12/19/17 12:00 AM   Result Value Ref Range    Glucose 443 (H) 70 - 110 mg/dL   Glucose, random (Unless on insulin infusion)    Collection Time: 12/19/17  3:57 AM   Result Value Ref Range    Glucose 480 (HH) 70 - 110 mg/dL   Basic metabolic panel    Collection Time: 12/19/17  3:57 AM   Result Value Ref Range    Sodium 131 (L) 136 - 145 mmol/L    Potassium 3.7 3.5 - 5.1 mmol/L    Chloride 100 95 - 110 mmol/L    CO2 19 (L) 23 - 29 mmol/L    Glucose 480 (HH) 70 - 110 mg/dL    BUN, Bld 37 (H) 8 - 23 mg/dL    Creatinine 2.3 (H) 0.5 - 1.4 mg/dL    Calcium 7.7 (L) 8.7 - 10.5 mg/dL    Anion Gap 12 8 - 16 mmol/L    eGFR if African American 23 (A) >60 mL/min/1.73  m^2    eGFR if non African American 20 (A) >60 mL/min/1.73 m^2   Magnesium    Collection Time: 12/19/17  3:57 AM   Result Value Ref Range    Magnesium 1.1 (L) 1.6 - 2.6 mg/dL   Phosphorus    Collection Time: 12/19/17  3:57 AM   Result Value Ref Range    Phosphorus 2.8 2.7 - 4.5 mg/dL   Calcium, ionized    Collection Time: 12/19/17  3:57 AM   Result Value Ref Range    Calcium, Ion 1.01 (L) 1.06 - 1.42 mmol/L   CK    Collection Time: 12/19/17  3:57 AM   Result Value Ref Range    CPK 70756 (H) 20 - 180 U/L   Calcium, ionized    Collection Time: 12/19/17  8:10 AM   Result Value Ref Range    Calcium, Ion 0.99 (L) 1.06 - 1.42 mmol/L   Troponin I    Collection Time: 12/19/17  8:10 AM   Result Value Ref Range    Troponin I 0.620 (H) 0.000 - 0.026 ng/mL     Current Imaging Results:    Imaging Results          X-Ray Chest 1 View (Final result)  Result time 12/19/17 08:31:17    Final result by Uzma Harrison MD (12/19/17 08:31:17)                 Impression:      Satisfactory positioning of support devices.    Unchanged pulmonary edema.  Small right pleural effusion, trace left pleural effusion, and associated bibasilar airspace disease.      Electronically signed by: UZMA HARRISON  Date:     12/19/17  Time:    08:31              Narrative:    CLINICAL HISTORY:  ETT    TECHNIQUE: Single view portable frontal radiograph of the chest    COMPARISON: Chest radiograph 12/18/17      FINDINGS:  Support devices: Endotracheal tube tip overlies the midthoracic trachea.  Enteric tube distally overlies the stomach with the tip below the field-of-view.    Chest: Cardiomediastinal silhouette is normal.  Mild hazy opacification diffusely throughout the lungs is unchanged.  There is a small right pleural effusion with associated hazy right basilar airspace opacification.  Suggestion of a trace left pleural effusion with mild subsegmental atelectasis in the left lung base.  No pneumothorax.    Upper Abdomen: Normal.    Other: N/A.                              US Lower Extremity Veins Bilateral (Final result)  Result time 12/18/17 17:22:18    Final result by Eulogio Gold MD (12/18/17 17:22:18)                 Impression:      1. Limited evaluation of the right lower extremity venous secondary to multiple overlying artifacts.  No evidence of deep vein thrombosis in the visualized popliteal, posterior tibialis, and peroneal veins on the right.    2.  No evidence of DVT in the left lower extremity.      Electronically signed by: EULOGIO GOLD MD  Date:     12/18/17  Time:    17:22              Narrative:    [id number]  12/18/17  15:53:27 [id number] ([facility]) : US LOWER EXTREMITY VEINS BILATERAL    SUPPLIED CLINICAL HISTORY:  rule out DVT    ADDITIONAL CLINICAL HISTORY: N/A    TECHNIQUE: Duplex and color flow Doppler evaluation of the bilateral lower extremities.    COMPARISON: None.    FINDINGS:  The right common femoral vein, right femoral vein, greater saphenous vein, and the anterior tibial is not well visualized due to multiple overlying artifacts.  The right popliteal vein, right posterior tibialis vein, and the right peroneal vein are within normal limits.      Left lower extremity has  no evidence of acute venous thrombosis in the common femoral, superficial femoral, greater saphenous, popliteal, peroneal, anterior tibial, and posterior tibial veins.                             X-Ray Abdomen Portable (Final result)  Result time 12/18/17 04:28:00    Final result by Osmar Felton MD (12/18/17 04:28:00)                 Impression:     Nonspecific gaseous distention of large and small bowel, but overall nonobstructive bowel gas pattern.      Electronically signed by: Osmar Felton  Date:     12/18/17  Time:    04:28              Narrative:    EXAM: ABDOMEN 1 VIEW    CLINICAL INDICATION:  Abdominal distention..    COMPARISON:  None.    TECHNIQUE:  Single AP supine view of the abdomen was obtained.    FINDINGS:  Nonspecific gaseous distention of large and small bowel,  in an overall nonobstructive pattern.  Nasogastric tube terminates in the body of the stomach. Right femoral central venous catheter is present. Grady catheter is also noted. No osseous abnormalities are seen.                             X-ray chest AP portable (Final result)  Result time 12/18/17 04:26:09    Final result by Osmar Felton MD (12/18/17 04:26:09)                 Impression:        Satisfactory positioning of endotracheal and endogastric tubes.    No detrimental change in lung aeration.      Electronically signed by: Osmar Felton  Date:     12/18/17  Time:    04:26              Narrative:    CLINICAL INFORMATION: Cardiac arrest.    COMPARISON: 01/14/2017.    FINDINGS: One view of the chest was obtained.  The patient is slightly rotated. Cardiac wires overlie the chest. Endotracheal tube is approximately 2.5 cm above the stephanie. Nasogastric tube terminates below the diaphragm and out of the field of view. Cardiac silhouette is not enlarged.  No significant airspace disease.  Bilateral pleural effusions, right side greater than left, similar in appearance to the prior exam.                                Assessment and Plan:     Problem List:    Active Diagnoses:    Diagnosis Date Noted POA    PRINCIPAL PROBLEM:  Sudden cardiac arrest [I46.9] 12/18/2017 Yes    Leukocytosis [D72.829] 12/18/2017 Yes    Hyperglycemia [R73.9] 12/18/2017 Yes    (HFpEF) heart failure with preserved ejection fraction [I50.30] 09/03/2016 Yes    HLD (hyperlipidemia) [E78.5] 09/03/2016 Yes    COPD (chronic obstructive pulmonary disease) [J44.9] 11/18/2014 Yes     Chronic    Diabetes mellitus [E11.9] 11/18/2014 Yes     Chronic    Hypertension [I10] 11/18/2014 Yes     Chronic    Acute-on-chronic respiratory failure [J96.20] 11/18/2014 Yes      Problems Resolved During this Admission:    Diagnosis Date Noted Date Resolved POA     Assessment and Plan:     1. Cardiopulmonary Arrest              19:25 Cardiopulmonary  arrest while getting respiratory treatment. Troponin to 2.              Suspect primary respiratory event. Possibly arrhythmia.              Acute myocardial infarction appears less likely with no acute ST T wave changes.              Follow troponins and ECGs.              Echo report not yet available.              Cath a later date if mental status recovers.       VTE Risk Mitigation         Ordered     enoxaparin injection 40 mg  Daily     Route:  Subcutaneous        12/18/17 1330     Medium Risk of VTE  Once      12/18/17 0304     Place sequential compression device  Until discontinued      12/18/17 0304          Yan Lucero MD  Cardiology  [facility]

## 2017-12-19 NOTE — PLAN OF CARE
Problem: Patient Care Overview  Goal: Plan of Care Review  Outcome: Ongoing (interventions implemented as appropriate)  Pt remains on [facility] vent with documented settings. Tx given and tolerated well. Oral care done.

## 2017-12-19 NOTE — ASSESSMENT & PLAN NOTE
- Patient hyperglycemic   - She was on detemir 35 U qhs and sliding scale at home  - A1C 10.3  - Continue SSI - Accuchecks are starting to decrease while patient NPO and expect this trend to continue  - Poor renal function at baseline, making urine - continue close monitoring.

## 2017-12-19 NOTE — PLAN OF CARE
Problem: Patient Care Overview  Goal: Plan of Care Review  Outcome: Ongoing (interventions implemented as appropriate)  STILL UNRESPONSIVE, SOME POSTURING. PUPILS FIXED.STILL ON LEVO WITH NAP 66-74. REWARMING TO START @ 0920.

## 2017-12-19 NOTE — HOSPITAL COURSE
Patient was admitted to ICU and a cooling protocol was initiated.  Her lactic acid level was elevated without a source of infection noted, and she was treated with IV Zosyn presumptively while awaiting urine and blood cultures that proved to be negative.  An EEG was done on  and showed no brain activity.  This was followed by a CT scan that showed diffuse cerebral edema and crowding of the foramen magnum consistent with impending herniation.  In addition it showed previous strokes in the right frontal and left parietal regions.    Patient's family were hoping for a miracle recovery, but eventually agreed to no chest compressions or shocks on the patient's passing.  She was seen by the neurologist to determine brain death criteria, and he found she had no sign of cortical or brainstem function on exam.  Recommended an apnea test, which patient failed.  Care was withdrawn on 2017 and she  at 09:12.

## 2017-12-19 NOTE — SUBJECTIVE & OBJECTIVE
Interval History:  Patient is new to me.  Cooling period is complete and she is being rewarmed.  She is breathing about 2x/minute over the ventilator and making a small amount of urine, but otherwise is unresponsive on the ventilator without sedation.    Review of Systems   Unable to perform ROS: Intubated     Objective:     Vital Signs (Most Recent):  Temp: (!) 95 °F (35 °C) (12/19/17 1000)  Pulse: 72 (12/19/17 0757)  Resp: (!) 27 (12/19/17 0757)  BP: (!) 95/46 (12/19/17 0645)  SpO2: 100 % (12/19/17 0757) Vital Signs (24h Range):  Temp:  [95 °F (35 °C)-98.1 °F (36.7 °C)] 95 °F (35 °C)  Pulse:  [72-88] 72  Resp:  [22-37] 27  SpO2:  [80 %-100 %] 100 %  BP: ()/() 95/46     Weight: 108.8 kg (239 lb 13.8 oz)  Body mass index is 43.87 kg/m².    Intake/Output Summary (Last 24 hours) at 12/19/17 1110  Last data filed at 12/19/17 1000   Gross per 24 hour   Intake          1775.16 ml   Output             1295 ml   Net           480.16 ml      Physical Exam   Constitutional: She appears well-developed.   Obese woman unresponsive on ventilator.   HENT:   Head: Normocephalic.   Eyes: Conjunctivae are normal.   Neck: Neck supple. No thyromegaly present.   Cardiovascular: Normal rate, regular rhythm, normal heart sounds and intact distal pulses.  Exam reveals no gallop and no friction rub.    No murmur heard.  Pulmonary/Chest: She has no wheezes. She has no rales.   Breathing unlabored on ventilator.  Coarse ventilator noise appreciated.   Abdominal: Soft. She exhibits distension. There is no tenderness.   Bowel sounds decreased.   Musculoskeletal: She exhibits no edema.   Lymphadenopathy:     She has no cervical adenopathy.   Neurological:   Unresponsive to voice or touch.  Not following commands.  No oculocephalic reflex noted.   Skin: Skin is warm and dry. No rash noted.       Significant Labs:   CBC:   Recent Labs  Lab 12/18/17  0313   WBC 13.72*   HGB 12.5   HCT 40.5   *     CMP:   Recent Labs  Lab  12/19/17  0000 12/19/17  0357 12/19/17  0810   * 131* 133*   K 3.7 3.7 3.4*    100 103   CO2 18* 19* 17*   *  443* 480*  480* 479*  479*   BUN 36* 37* 37*   CREATININE 2.1* 2.3* 2.2*   CALCIUM 7.5* 7.7* 7.1*   ANIONGAP 13 12 13   EGFRNONAA 22* 20* 21*     Lactic Acid:   Recent Labs  Lab 12/18/17  0815 12/18/17  1159 12/18/17  1602   LACTATE 3.6* 2.7*  2.7* 2.6*       Significant Imaging: I have reviewed all pertinent imaging results/findings within the past 24 hours.

## 2017-12-19 NOTE — PLAN OF CARE
"Problem: Patient Care Overview  Goal: Plan of Care Review  Outcome: Ongoing (interventions implemented as appropriate)  Patient was GCS 4/15 (E1V1M1), isocoric, with "2" mm pupil size on both eyes, non-reactive to light. On cooling measures with a setpoint of 36 C. On ventilatory support wwith FiO2 decreased to 40%, PEEP 5 and a rate of 28. On inotropic support (norepinephrine), to maintain a MAP of 65 mmHg. Ultrasound of the lower extremities done to rule out DVT and blood tests were taken as ordered. Suctioned ETT and oral secretions regularly and as needed. No bowel movement, and noticed firm and rounded abdomen with hypoactive bowel sounds (MD aware).Turn to sides every 2 hours. All pressure points are intact with rectal thermometer in placed. Patient was oliguric initially and was making good urine output of (40 ml/hr) intermittently.    All procedures were explained to patient. Safety reinforced. Fall risk precaution observed.      "

## 2017-12-19 NOTE — ASSESSMENT & PLAN NOTE
- Cardiac arrest occurred in conjunction with patient giving herself a respiratory treatment  - follows with pulmonology: Dr. Ventura  - as of 1/2017 she was on home oxygen 2 liters

## 2017-12-19 NOTE — ASSESSMENT & PLAN NOTE
- History of COPD on home oxygen  - Intubated and on ventilator   - Pulmonary/critical care following

## 2017-12-19 NOTE — PLAN OF CARE
Problem: Ventilation, Mechanical Invasive (Adult)  Goal: Signs and Symptoms of Listed Potential Problems Will be Absent, Minimized or Managed (Ventilation, Mechanical Invasive)  Signs and symptoms of listed potential problems will be absent, minimized or managed by discharge/transition of care (reference Ventilation, Mechanical Invasive (Adult) CPG).   Outcome: Ongoing (interventions implemented as appropriate)  Patient on mechanical vent settings as documented. No changes made to settings. Tube repositioned to the left from center @0300.  Sats 97 to 99%.  Txs given.  Pt. in no distress, will continue to monitor.

## 2017-12-19 NOTE — PROGRESS NOTES
Pulmonary & Critical Care Medicine Progress Note    Subjective:   No issues overnight.    Vital Signs:   Vitals:    12/19/17 0645   BP: (!) 95/46   Pulse: 74   Resp: (!) 28   Temp:        Fluid Balance:     Intake/Output Summary (Last 24 hours) at 12/19/17 0750  Last data filed at 12/19/17 0600   Gross per 24 hour   Intake          2325.16 ml   Output             1075 ml   Net          1250.16 ml       Physical Exam:   General: NAD, intubated  HEENT: AT/NC, PERRL, EOMI, nasal and oral mucosa moist. ETT in place  Neck: Supple without JVD. Trachea midline. Thyroid feels normal.   Cardiac: RRR with no MRG with brisk cap refill and symmetric pulses in distal extremities.  Respiratory: Normal inspection. Symmetric chest rise.  Auscultation coarse bilaterally with resolution of wheezing bilaterally. No increased work of breathing noted.   Abdomen: Soft, NT/ND. +BS.   Extremities: Normal strength and tone (grossly). No visible atrophy. No clubbing or cyanosis on nail exam.   Skin: Warm and dry without visible rash. Femoral CVL in place on the right.  Neuro: No Gag, no corneal reflex, breathing above the vent, pupils non-reactive and small but not pinpoint, not responsive to pain but does move left fingers randomly    Laboratory Studies:     Recent Labs  Lab 12/18/17  1029   PH 7.253*   PCO2 51.3*   PO2 135*   HCO3 22.7*   POCSATURATED 99   BE -4     No results for input(s): WBC, RBC, HGB, HCT, PLT, MCV, MCH, MCHC in the last 24 hours.    Recent Labs  Lab 12/19/17  0357   *   K 3.7      CO2 19*   BUN 37*   CREATININE 2.3*   MG 1.1*       Microbiology Data:   Microbiology Results (last 7 days)     Procedure Component Value Units Date/Time    Culture, Respiratory with Gram Stain [[id number]] Collected:  12/18/17 4727    Order Status:  Completed Specimen:  Respiratory from Sputum Updated:  12/19/17 0782     Respiratory Culture No Growth     Gram Stain (Respiratory) <10 epithelial cells per low power field.     Gram  Stain (Respiratory) Rare WBC's     Gram Stain (Respiratory) Moderate Gram negative rods     Gram Stain (Respiratory) Rare Gram positive cocci     Gram Stain (Respiratory) Rare Gram negative diplococci    Blood culture [[id number]] Collected:  12/18/17 1040    Order Status:  Completed Specimen:  Blood Updated:  12/18/17 1945     Blood Culture, Routine No Growth to date    Blood culture [[id number]] Collected:  12/18/17 0740    Order Status:  Completed Specimen:  Blood Updated:  12/18/17 1745     Blood Culture, Routine No Growth to date    Urine culture [[id number]] Collected:  12/18/17 0535    Order Status:  Sent Specimen:  Urine from Urine, Catheterized Updated:  12/18/17 0747           Chest Imaging:   No new imaging.     Infusions:     sodium chloride 0.9% 100 mL/hr at 12/19/17 0721    norepinephrine bitartrate-D5W 0.03 mcg/kg/min (12/19/17 0330)       Scheduled Medications:    acetaminophen  650 mg Per NG tube Q6H    albuterol-ipratropium 2.5mg-0.5mg/3mL  3 mL Nebulization Q4H    busPIRone  30 mg Per NG tube Once    chlorhexidine  15 mL Mouth/Throat BID    enoxaparin  40 mg Subcutaneous Daily    insulin detemir  28 Units Subcutaneous QHS    methylPREDNISolone sodium succinate  40 mg Intravenous Daily    pantoprazole  40 mg Intravenous BID    piperacillin-tazobactam 4.5 g in dextrose 5 % 100 mL IVPB (ready to mix system)  4.5 g Intravenous Q8H    vancomycin (VANCOCIN) IVPB  15 mg/kg Intravenous Q12H       PRN Medications:   calcium chloride IVPB, calcium chloride IVPB, calcium chloride IVPB, dextrose 50%, glucagon (human recombinant), insulin aspart, magnesium sulfate IVPB, magnesium sulfate IVPB, magnesium sulfate IVPB, potassium chloride **AND** potassium chloride **AND** potassium chloride, sodium chloride 0.9%, sodium phosphate IVPB, sodium phosphate IVPB, sodium phosphate IVPB    Assessment & Plan:   Patient Active Problem List   Diagnosis    Acute-on-chronic respiratory failure    COPD (chronic  obstructive pulmonary disease)    Diabetes mellitus    Hypertension    (HFpEF) heart failure with preserved ejection fraction    HLD (hyperlipidemia)    CAP (community acquired pneumonia)    Sudden cardiac arrest    Cardiac arrest    Leukocytosis    Hyperglycemia      Ms. Sharmila Campos is a 76 y/o female, with PMH of COPD, HFpEF, HTN, HLD, IDDM, CVA, MI here s/p cardiac arrest (unknown rhythms) due to presumed hypoxic respiratory failure secondary to COPD exacerbation though cause still to be evaluated further with ROSC > 1 hour     Neuro- limited exam though it is quite early to say how this will evolve with time. Will need CT head today once cooling protocol done. Full re-assessment of neuro status at 72 hours. No gross evidence of seizing though has intermittent posturing.      CV-  on minimal levophed now (maintain MAP >65) while hypothermia protocol underway with goal temp of 36 C. Troponins rising so trend until peak noted. Cardiology consulted. Echo done but pending. CK elevated and continues to rise- agree with IVF at this point. BNP normal. Hypothermia protocol initiated and will re-warm today.     Resp- Minimal vent settings though with still with a slight respiratory acidosis on ABG yesterday; RR increased on vent; LTVV 6 cc/kg on 40%/5 PEEP. Wheezing on exam improved, continue duo-nebs q 4 and steroids due to underlying COPD. Need to consider CTA to rule out PE though would not empirically treat with anti-coagulation until CT head done to rule out a bleed. LE US negative for DVT.      Heme/ID-  agree with broad spectrum Abx and cultures, procalcitonin elevated at 13. Lactic acid q 4 with improvement from 3.6 to 2.7. Vanc random this AM before re-dosing.    Renal- BUN/Cr stable and UOP 30-40cc/hr which is adequate.     Endocrine- hyperglycemia likely due to steroids-- changed IVF in piggybacks to NS from D5W. Will start insulin gtt (BG goal 180).      PPx- PPI + lovenox      Alley Paccione,  MD [facility] Critical [facility] Fellow

## 2017-12-20 NOTE — PROGRESS NOTES
Pulmonary & Critical Care Medicine Progress Note    Subjective:   No issues overnight. EEG yesterday without brain activity.     Vital Signs:   Vitals:    12/20/17 0721   BP:    Pulse: 75   Resp: (!) 28   Temp:        Fluid Balance:     Intake/Output Summary (Last 24 hours) at 12/20/17 0737  Last data filed at 12/20/17 0600   Gross per 24 hour   Intake          2462.87 ml   Output              890 ml   Net          1572.87 ml       Physical Exam:   General: NAD, intubated  HEENT: AT/NC, PERRL, EOMI, nasal and oral mucosa moist. ETT in place  Neck: Supple without JVD. Trachea midline. Thyroid feels normal.   Cardiac: RRR with no MRG with brisk cap refill and symmetric pulses in distal extremities.  Respiratory: Normal inspection. Symmetric chest rise.  Auscultation coarse bilaterally with resolution of wheezing bilaterally. No increased work of breathing noted.   Abdomen: Soft, NT/ND. +BS.   Extremities: Normal strength and tone (grossly). No visible atrophy. No clubbing or cyanosis on nail exam.   Skin: Warm and dry without visible rash. Femoral CVL in place on the right.  Neuro: No Gag, no corneal reflex, breathing above the vent, pupils non-reactive and small but not pinpoint, not responsive to pain but does move left fingers randomly    Laboratory Studies:     Recent Labs  Lab 12/20/17  0457   PH 7.420   PCO2 29.5*   PO2 140*   HCO3 19.1*   POCSATURATED 99   BE -5       Recent Labs  Lab 12/20/17  0403   WBC 10.82   RBC 3.32*   HGB 9.2*   HCT 27.3*   PLT 63*   MCV 82   MCH 27.7   MCHC 33.7       Recent Labs  Lab 12/20/17  0403      K 3.5      CO2 18*   BUN 46*   CREATININE 2.4*   MG 1.6       Microbiology Data:   Microbiology Results (last 7 days)     Procedure Component Value Units Date/Time    Blood culture [[id number]] Collected:  12/18/17 1040    Order Status:  Completed Specimen:  Blood Updated:  12/19/17 1412     Blood Culture, Routine No Growth to date     Blood Culture, Routine No Growth to date     Blood culture [[id number]] Collected:  12/18/17 0740    Order Status:  Completed Specimen:  Blood Updated:  12/19/17 1212     Blood Culture, Routine No Growth to date     Blood Culture, Routine No Growth to date    Urine culture [[id number]] Collected:  12/18/17 0535    Order Status:  Completed Specimen:  Urine from Urine, Catheterized Updated:  12/19/17 0832     Urine Culture, Routine No growth    Culture, Respiratory with Gram Stain [[id number]] Collected:  12/18/17 1745    Order Status:  Completed Specimen:  Respiratory from Sputum Updated:  12/19/17 0744     Respiratory Culture No Growth     Gram Stain (Respiratory) <10 epithelial cells per low power field.     Gram Stain (Respiratory) Rare WBC's     Gram Stain (Respiratory) Moderate Gram negative rods     Gram Stain (Respiratory) Rare Gram positive cocci     Gram Stain (Respiratory) Rare Gram negative diplococci           Chest Imaging:   No new imaging.     Infusions:     sodium chloride 0.9% 100 mL/hr at 12/20/17 0212    insulin (HUMAN R) infusion (adults) 7.5 Units/hr (12/20/17 0538)    norepinephrine bitartrate-D5W Stopped (12/19/17 1945)       Scheduled Medications:    albuterol-ipratropium 2.5mg-0.5mg/3mL  3 mL Nebulization Q4H    chlorhexidine  15 mL Mouth/Throat BID    enoxaparin  40 mg Subcutaneous Daily    methylPREDNISolone sodium succinate  40 mg Intravenous Daily    pantoprazole  40 mg Intravenous BID    custom IVPB builder   Intravenous Q8H       PRN Medications:   dextrose 50%, glucagon (human recombinant), insulin aspart, sodium chloride 0.9%    Assessment & Plan:   Patient Active Problem List   Diagnosis    Acute-on-chronic respiratory failure    COPD (chronic obstructive pulmonary disease)    Type 2 diabetes mellitus with stage 4 chronic kidney disease, with long-term current use of insulin    Essential hypertension    (HFpEF) heart failure with preserved ejection fraction    HLD (hyperlipidemia)    CAP (community acquired  pneumonia)    Sudden cardiac arrest    Cardiac arrest    Leukocytosis    Hyperglycemia      Ms. Sharmila Campos is a 76 y/o female, with PMH of COPD, HFpEF, HTN, HLD, IDDM, CVA, MI here s/p cardiac arrest (unknown rhythms) due to presumed hypoxic respiratory failure secondary to COPD exacerbation though cause still unclear with ROSC > 1 hour     Neuro- CT head with impending herniation: Diffuse cerebral edema type pattern with effacement of cerebral sulci, basilar cisterns, cerebellar folia, and of the 4th ventricle.  Associated suggestion of crowding of the foramen magnum. Likely superimposed remote right frontal and left parietal infarcts.EEG with no brain activity. Neuro consulted.     CV-  now off levophed (maintain MAP >65); hypothermia protocol ended yesterday. CK down to 2991 from 15032.     Resp- Minimal vent settings; ABG 7.4/29/140 this AM.     Heme/ID-  vanc supratherapeutic; cultures negative. WBC normal. Vanc random this AM. On zosyn- will stop this AM.    Renal- BUN/Cr stable and UOP 30-40cc/hr which is adequate.     Endocrine- hyperglycemia likely due to steroids-- changed IVF in piggybacks to NS from D5W. Continue insulin gtt (BG goal 180). Steroids discontinued.     PPx- PPI + lovenox    Code status changed to partial due to already intubated- but no chest compressions or ACLS if codes. No vasopressor support. Discussed overall prognosis with daughter and grandsons today. She is not clinically brain dead yet since she is taking spontaneous breaths- we explained this to the family. They would like to have additional family members come in to see her at this point. Will perform daily SBT to assess for spontaneous breathing.     Would lean towards comfort measures though need to clarify this with family.       Alley Bright MD  [facility] Critical [facility] Fellow

## 2017-12-20 NOTE — PLAN OF CARE
Problem: Airway, Artificial (Adult)  Goal: Signs and Symptoms of Listed Potential Problems Will be Absent, Minimized or Managed (Airway, Artificial)  Signs and symptoms of listed potential problems will be absent, minimized or managed by discharge/transition of care (reference Airway, Artificial (Adult) CPG).   Outcome: Ongoing (interventions implemented as appropriate)  Received pt on  orally intubated with 7.5 ETT secured @ 25 cm yonis R lip BBS = wheeze aerosol treattment given tolerated well pt has no gag reflex will continue to monitor.

## 2017-12-20 NOTE — PLAN OF CARE
Problem: Patient Care Overview  Goal: Plan of Care Review  Outcome: Ongoing (interventions implemented as appropriate)  VSS overnight. Levo gtt weaned off @ 1945; MAP maintained >65 per order. Vent maintained. Remains unresponsive, pupils nonreactive. Posturing noted @ 0305 assessment. Urine output decreased overnight but remained adequate. CBG monitoring Q1H - insulin gtt titrated according to algorithm per order. Repositioned Q2H. Bed bath given and linens changed. Will monitor.

## 2017-12-20 NOTE — PLAN OF CARE
Problem: Ventilation, Mechanical Invasive (Adult)  Goal: Signs and Symptoms of Listed Potential Problems Will be Absent, Minimized or Managed (Ventilation, Mechanical Invasive)  Signs and symptoms of listed potential problems will be absent, minimized or managed by discharge/transition of care (reference Ventilation, Mechanical Invasive (Adult) CPG).   Outcome: Ongoing (interventions implemented as appropriate)  Patient on mechanical vent settings as documented. Ett repositioned to the right. Sats 100%. Txs given. ABG done. Ph and other values within normal range. Pt. in no distress, will continue to monitor.

## 2017-12-20 NOTE — CONSULTS
[facility]  Neurology  Consult Note    Patient Name: Sharmila Campos  MRN: [id number]  Admission Date: 12/18/2017  Hospital Length of Stay: 2 days  Code Status: Full Code   Attending Provider: Jeannine Xie MD   Consulting Provider: Dmitry Walters III, MD  Primary Care Physician: Noah Jimenez MD  Principal Problem:Sudden cardiac arrest    Consults   Subjective:          HPI:   Neurology has been consulted for the purposes of Brain Death Examination. Pt is intubated and unresponsive. History has been obtained from Chart review and discussion with the primary team, critical care team and nursing.     From EMR -   78 y/o female with HTN, HLD, DM2, obesity, COPD, CVA, a history of MI and CHF. She presented to OSH on 12/17/17 with cardiopulmonary arrest. She recieved CPR for approximately 20 minutes beginning at home. EMS was called to the scene and CPR was continued. ROSC was not regained for almost 2 hours. She was transported to [facility] and the hypothermia protocol was initiated.     Past Medical History:   Diagnosis Date    COPD (chronic obstructive pulmonary disease)     Depression     Diabetes mellitus     Heart attack     High cholesterol     Hypertension     Stroke        Past Surgical History:   Procedure Laterality Date    CHOLECYSTECTOMY         Review of patient's allergies indicates:  No Known Allergies    Current Neurological Medications:     No current facility-administered medications on file prior to encounter.      Current Outpatient Prescriptions on File Prior to Encounter   Medication Sig    albuterol (PROAIR HFA) 90 mcg/actuation inhaler Inhale 2 puffs into the lungs every 4 (four) hours as needed for Wheezing or Shortness of Breath.    albuterol-ipratropium 2.5mg-0.5mg/3mL (DUO-NEB) 0.5 mg-3 mg(2.5 mg base)/3 mL nebulizer solution Take 3 mLs by nebulization every 4 (four) hours.    aspirin 81 MG Chew Take 1 tablet (81 mg total) by mouth once  daily.    budesonide-formoterol 160-4.5 mcg (SYMBICORT) 160-4.5 mcg/actuation HFAA inhale 2 puffs by mouth every 12 hours    gabapentin (NEURONTIN) 300 MG capsule Take 1 capsule (300 mg total) by mouth 3 (three) times daily.    insulin glargine (LANTUS) 100 unit/mL injection Inject 40 Units into the skin once daily.    insulin syringe-needle U-100 (BD INSULIN SYRINGE ULTRA-FINE) 0.5 mL 31 gauge x 5/16 Syrg Inject 1 each into the skin 2 hours after meals and at bedtime.    pravastatin (PRAVACHOL) 20 MG tablet Take 1 tablet (20 mg total) by mouth once daily.    ramipril (ALTACE) 5 MG capsule Take 1 capsule (5 mg total) by mouth once daily.    sertraline (ZOLOFT) 25 MG tablet Take 1 tablet (25 mg total) by mouth every evening.     Family History     Problem Relation (Age of Onset)    Cancer Sister, Son    Drug abuse Son    Heart disease Mother        Social History Main Topics    Smoking status: Former Smoker     Quit date: 11/12/2006    Smokeless tobacco: Not on file    Alcohol use No    Drug use: No    Sexual activity: No     Review of Systems   Unable to perform ROS: Intubated     Objective:     Vital Signs (Most Recent):  Temp: 96.1 °F (35.6 °C) (12/20/17 0530)  Pulse: 82 (12/20/17 0933)  Resp: (!) 27 (12/20/17 0933)  BP: (!) 94/47 (12/20/17 0933)  SpO2: 100 % (12/20/17 0933) Vital Signs (24h Range):  Temp:  [95 °F (35 °C)-98.9 °F (37.2 °C)] 96.1 °F (35.6 °C)  Pulse:  [72-84] 82  Resp:  [27-28] 27  SpO2:  [98 %-100 %] 100 %  BP: ()/(34-60) 94/47     Weight: 110.3 kg (243 lb 2.7 oz)  Body mass index is 44.48 kg/m².    Physical Exam     I have reviewed the patient's lab studies and there are no derangements that would invalidate the neurological examination.     I have reviewed the patient's medication list and they have not recently gotten sedative/hypnotic/paralytic medications that would interfere with the neurological examination.     The patient is not overbreathing the ventilator    Neurologic  Exam:  Patient is in a state of coma and is unarousable to voice or tactile stimuli  Pupillary Light Reflex is absent  Corneal Reflex is absent  Gag Reflex is absent  Cold Caloric Reflex is absent  Supraorbital and Temporomandibular painful stimuli elicits no response, no grimace  Painful stimuli applied at all 4 limbs elicits no response, no grimace  Extension pronation spinal reflex noted, but not actual decerebrate posturing           Significant Labs:   CBC:   Recent Labs  Lab 12/20/17  0403   WBC 10.82   HGB 9.2*   HCT 27.3*   PLT 63*     CMP:   Recent Labs  Lab 12/19/17  0810 12/19/17  1931 12/20/17  0125 12/20/17  0128 12/20/17  0403   *  479* 362* 213*  --  184*   * 136 137  --  137   K 3.4* 3.2* 3.6  --  3.5    106 109  --  109   CO2 17* 19* 18*  --  18*   BUN 37* 43* 45*  --  46*   CREATININE 2.2* 2.4* 2.4*  --  2.4*   CALCIUM 7.1* 7.6* 7.6*  --  7.6*   MG 1.1*  --   --  1.7 1.6   PROT  --   --   --   --  4.8*   ALBUMIN  --   --   --   --  1.9*   BILITOT  --   --   --   --  0.4   ALKPHOS  --   --   --   --  84   AST  --   --   --   --  169*   ALT  --   --   --   --  68*   ANIONGAP 13 11 10  --  10   EGFRNONAA 21* 19* 19*  --  19*       Significant Imaging: CT: I have reviewed all pertinent results/findings within the past 24 hours and my personal findings are:  extensive signs of hypoxic ischemic injury    Assessment and Plan:     Brain death    The patient does not show signs of cortical or brainstem function based on my examination, suggesting Brain Death. I recommend an Apnea Test to comply with state and Federal Laws regarding pronouncement of Brain Death.         I have communicated my findings with the ICU team and the primary team. Apnea test pending. CT confirms irreversible brain damage from hypoxia/ischemia. EEG shows no significant cerebral electrical activity.     I am available to speak to the family if requested. I have left my contact information with the ICU team and the  primary team.             VTE Risk Mitigation         Ordered     enoxaparin injection 40 mg  Daily     Route:  Subcutaneous        12/18/17 1330     Medium Risk of VTE  Once      12/18/17 0304     Place sequential compression device  Until discontinued      12/18/17 0304          Thank you for your consult. I will sign off. Please contact us if you have any additional questions.    Dmitry Walters III, MD  Neurology  [facility]    I have spent a total of more than 50% of a 35 minute visit in chart review, lab review, personal image review, patient centered care, coordination of care and seeing the patient.

## 2017-12-20 NOTE — SUBJECTIVE & OBJECTIVE
Past Medical History:   Diagnosis Date    COPD (chronic obstructive pulmonary disease)     Depression     Diabetes mellitus     Heart attack     High cholesterol     Hypertension     Stroke        Past Surgical History:   Procedure Laterality Date    CHOLECYSTECTOMY         Review of patient's allergies indicates:  No Known Allergies    Current Neurological Medications:     No current facility-administered medications on file prior to encounter.      Current Outpatient Prescriptions on File Prior to Encounter   Medication Sig    albuterol (PROAIR HFA) 90 mcg/actuation inhaler Inhale 2 puffs into the lungs every 4 (four) hours as needed for Wheezing or Shortness of Breath.    albuterol-ipratropium 2.5mg-0.5mg/3mL (DUO-NEB) 0.5 mg-3 mg(2.5 mg base)/3 mL nebulizer solution Take 3 mLs by nebulization every 4 (four) hours.    aspirin 81 MG Chew Take 1 tablet (81 mg total) by mouth once daily.    budesonide-formoterol 160-4.5 mcg (SYMBICORT) 160-4.5 mcg/actuation HFAA inhale 2 puffs by mouth every 12 hours    gabapentin (NEURONTIN) 300 MG capsule Take 1 capsule (300 mg total) by mouth 3 (three) times daily.    insulin glargine (LANTUS) 100 unit/mL injection Inject 40 Units into the skin once daily.    insulin syringe-needle U-100 (BD INSULIN SYRINGE ULTRA-FINE) 0.5 mL 31 gauge x 5/16 Syrg Inject 1 each into the skin 2 hours after meals and at bedtime.    pravastatin (PRAVACHOL) 20 MG tablet Take 1 tablet (20 mg total) by mouth once daily.    ramipril (ALTACE) 5 MG capsule Take 1 capsule (5 mg total) by mouth once daily.    sertraline (ZOLOFT) 25 MG tablet Take 1 tablet (25 mg total) by mouth every evening.     Family History     Problem Relation (Age of Onset)    Cancer Sister, Son    Drug abuse Son    Heart disease Mother        Social History Main Topics    Smoking status: Former Smoker     Quit date: 11/12/2006    Smokeless tobacco: Not on file    Alcohol use No    Drug use: No    Sexual  activity: No     Review of Systems   Unable to perform ROS: Intubated     Objective:     Vital Signs (Most Recent):  Temp: 96.1 °F (35.6 °C) (12/20/17 0530)  Pulse: 82 (12/20/17 0933)  Resp: (!) 27 (12/20/17 0933)  BP: (!) 94/47 (12/20/17 0933)  SpO2: 100 % (12/20/17 0933) Vital Signs (24h Range):  Temp:  [95 °F (35 °C)-98.9 °F (37.2 °C)] 96.1 °F (35.6 °C)  Pulse:  [72-84] 82  Resp:  [27-28] 27  SpO2:  [98 %-100 %] 100 %  BP: ()/(34-60) 94/47     Weight: 110.3 kg (243 lb 2.7 oz)  Body mass index is 44.48 kg/m².    Physical Exam     I have reviewed the patient's lab studies and there are no derangements that would invalidate the neurological examination.     I have reviewed the patient's medication list and they have not recently gotten sedative/hypnotic/paralytic medications that would interfere with the neurological examination.     The patient is not overbreathing the ventilator    Neurologic Exam:  Patient is in a state of coma and is unarousable to voice or tactile stimuli  Pupillary Light Reflex is absent  Corneal Reflex is absent  Gag Reflex is absent  Cold Caloric Reflex is absent  Supraorbital and Temporomandibular painful stimuli elicits no response, no grimace  Painful stimuli applied at all 4 limbs elicits no response, no grimace  Extension pronation spinal reflex noted, but not actual decerebrate posturing           Significant Labs:   CBC:   Recent Labs  Lab 12/20/17  0403   WBC 10.82   HGB 9.2*   HCT 27.3*   PLT 63*     CMP:   Recent Labs  Lab 12/19/17  0810 12/19/17  1931 12/20/17  0125 12/20/17  0128 12/20/17  0403   *  479* 362* 213*  --  184*   * 136 137  --  137   K 3.4* 3.2* 3.6  --  3.5    106 109  --  109   CO2 17* 19* 18*  --  18*   BUN 37* 43* 45*  --  46*   CREATININE 2.2* 2.4* 2.4*  --  2.4*   CALCIUM 7.1* 7.6* 7.6*  --  7.6*   MG 1.1*  --   --  1.7 1.6   PROT  --   --   --   --  4.8*   ALBUMIN  --   --   --   --  1.9*   BILITOT  --   --   --   --  0.4   ALKPHOS   --   --   --   --  84   AST  --   --   --   --  169*   ALT  --   --   --   --  68*   ANIONGAP 13 11 10  --  10   EGFRNONAA 21* 19* 19*  --  19*       Significant Imaging: CT: I have reviewed all pertinent results/findings within the past 24 hours and my personal findings are:  extensive signs of hypoxic ischemic injury

## 2017-12-20 NOTE — PLAN OF CARE
Problem: Ventilation, Mechanical Invasive (Adult)  Goal: Signs and Symptoms of Listed Potential Problems Will be Absent, Minimized or Managed (Ventilation, Mechanical Invasive)  Signs and symptoms of listed potential problems will be absent, minimized or managed by discharge/transition of care (reference Ventilation, Mechanical Invasive (Adult) CPG).   Outcome: Ongoing (interventions implemented as appropriate)  Pt remains on vent on documented settings.treatments stopped per , Apnea test performed when suctioning  pt has no gag reflex. No other changes made today.

## 2017-12-20 NOTE — PLAN OF CARE
"Problem: Patient Care Overview  Goal: Plan of Care Review  Outcome: Ongoing (interventions implemented as appropriate)  Patient was GCS 4/15 with "3" mm pupil size on both eyes, non-reactive to light. On ventilatory support wwith FiO2 decreased to 40%, PEEP 5 and a rate of 28. On norepinephrine, to maintain a MAP of 65 mmHg. Negative for DVT per ultrasound. Suctioned ETT and oral secretions regularly and as needed. No bowel movement, and the pt. Had a firm abdomen (MD aware).Turn to sides every 2 hours. Rectal thermometer in placed. Urine output above 30 mL/hr.       "

## 2017-12-20 NOTE — PROCEDURES
ROUTINE ELECTROENCEPHALOGRAM REPORT      Sharmila Campos  [id number]  1940    DATE OF SERVICE: 12/19/17    REQUESTING PROVIDER: Jeannine Xie MD    REASON FOR CONSULT: 78yo F s/p cardiac arrest - on hypothermia protocol    PRIOR EEG: none    MEDICATIONS:   Current Facility-Administered Medications   Medication    0.9%  NaCl infusion    albuterol-ipratropium 2.5mg-0.5mg/3mL nebulizer solution 3 mL    calcium chloride 1 g in dextrose 5 % 100 mL IVPB    calcium chloride 1 g in dextrose 5 % 100 mL IVPB    calcium chloride 1 g in dextrose 5 % 100 mL IVPB    chlorhexidine 0.12 % solution 15 mL    dextrose 50% injection 12.5 g    enoxaparin injection 40 mg    glucagon (human recombinant) injection 1 mg    insulin aspart pen 1-10 Units    insulin regular (Humulin R) 100 Units in sodium chloride 0.9% 100 mL infusion    magnesium sulfate 2g in water 50mL IVPB (premix)    magnesium sulfate 2g in water 50mL IVPB (premix)    magnesium sulfate 2g in water 50mL IVPB (premix)    methylPREDNISolone sodium succinate injection 40 mg    norepinephrine 8 mg in dextrose 5 % 250 mL infusion    pantoprazole injection 40 mg    piperacillin-tazobactam 4.5 g in sodium chloride 0.9% 100 mL    potassium chloride 20 mEq in 100 mL IVPB (FOR CENTRAL LINE ADMINISTRATION ONLY)    And    potassium chloride 20 mEq in 100 mL IVPB (FOR CENTRAL LINE ADMINISTRATION ONLY)    And    potassium chloride 20 mEq in 100 mL IVPB (FOR CENTRAL LINE ADMINISTRATION ONLY)    sodium chloride 0.9% flush 3 mL    sodium phosphate 15 mmol in dextrose 5 % 250 mL IVPB    sodium phosphate 20.01 mmol in dextrose 5 % 250 mL IVPB    sodium phosphate 30 mmol in dextrose 5 % 250 mL IVPB     METHODOLOGY   Electroencephalographic (EEG) recording is with electrodes placed according to the International 10-20 placement system.  Thirty two (32) channels of digital signal (sampling rate of 512/sec) including T1 and T2 was simultaneously recorded from the  scalp and may include  EKG, EMG, and/or eye monitors.  Recording band pass was 0.1 to 512 hz.  Digital video recording of the patient is simultaneously recorded with the EEG.  The patient is instructed report clinical symptoms which may occur during the recording session.  EEG and video recording is stored and archived in digital format. Activation procedures which include photic stimulation, hyperventilation and instructing patients to perform simple task are done in selected patients.    The EEG is displayed on a monitor screen and can be reviewed using different montages.  Computer assisted analysis is employed to detect spike and electrographic seizure activity.   The entire record is submitted for computer analysis.  The entire recording is visually reviewed and the times identified by computer analysis as being spikes or seizures are reviewed again.  Compresses spectral analysis (CSA) is also performed on the activity recorded from each individual channel.  This is displayed as a power display of frequencies from 0 to 30 Hz over time.   The CSA is reviewed looking for asymmetries in power between homologous areas of the scalp and then compared with the original EEG recording.     The Smart Baker software was also utilized in the review of this study.  This software suite analyzes the EEG recording in multiple domains.  Coherence and rhythmicity is computed to identify EEG sections which may contain organized seizures.  Each channel undergoes analysis to detect presence of spike and sharp waves which have special and morphological characteristic of epileptic activity.  The routine EEG recording is converted from spacial into frequency domain.  This is then displayed comparing homologous areas to identify areas of significant asymmetry.  Algorithm to identify non-cortically generated artifact is used to separate eye movement, EMG and other artifact from the EEG    EEG FINGINGS  Generalized suppression pattern seen  without any discernable cerebral activity    Noxious stimulus was noted to induce clinical activity (head movement) but no associated EEG correlate noted.  EKG artifact was noted throughout.    IMPRESSION:   This is an abnormal EEG due to the generalized suppression pattern seen, suggestive of severe cerebral dysfunction.    CLINICAL CORRELATION IS RECOMMENDED.    Neha Marroquin MD, ALYSSA(), [facility].  Neurology-Epilepsy.

## 2017-12-20 NOTE — ASSESSMENT & PLAN NOTE
I have communicated my findings with the ICU team and the primary team. Apnea test pending. CT confirms irreversible brain damage from hypoxia/ischemia. EEG shows no significant cerebral electrical activity.     I am available to speak to the family if requested. I have left my contact information with the ICU team and the primary team.

## 2017-12-20 NOTE — PLAN OF CARE
ATTN: TEAM ABHAY PLANNING    ON VENT IN ICU     PER NOTES  IN EPIC CLIVE CASE      RECOMMENDATION  : SPIRITUAL CARE CONSULT  FOR PT/FAMILY    IF ANY NEEDS ARISE PLEASE CONTACT RN [facility]QU[facility] /[place] TEAM      NARCISA BE ON CASE ALSO # [id number]     Zahira Greer RN  Case management# [phone] (FAX) [phone]        12/20/17 1548   Discharge Assessment   Assessment Type Discharge Planning Reassessment

## 2017-12-20 NOTE — HPI
Neurology has been consulted for the purposes of Brain Death Examination. Pt is intubated and unresponsive. History has been obtained from Chart review and discussion with the primary team, critical care team and nursing.     From EMR -   76 y/o female with HTN, HLD, DM2, obesity, COPD, CVA, a history of MI and CHF. She presented to OSH on 12/17/17 with cardiopulmonary arrest. She recieved CPR for approximately 20 minutes beginning at home. EMS was called to the scene and CPR was continued. ROSC was not regained for almost 2 hours. She was transported to [facility] and the hypothermia protocol was initiated.

## 2017-12-20 NOTE — PROGRESS NOTES
[facility]  Cardiology  Progress Note    Patient Name: Sharmila Campos  MRN: [id number]  Admission Date: 12/18/2017  Hospital Length of Stay: 2 days  Code Status: Full Code   Attending Physician: Jeannine Xie MD   Primary Care Physician: Noah Jimenez MD  Expected Discharge Date:   Principal Problem:Sudden cardiac arrest    Subjective:     Brief HPI:    Ms. Sharmila Campos is a 76 y/o female with hypertension, hypercholesterolemia and diabetes mellitus type 2. She is obese. According to previous notes she has a history of a myocardial infarction. In addition she has heart failure with well preserved ejection fraction, chronic obstructive pulmonary disease and a history of a cerebrovascular accident.     She  presented to [facility] in [place] on 12/17/17 with cardiopulmonary arrest.  The arrest occured while she was completing a breathing treatment in her home. Bystanders performed CPR for approximately 20 minutes beginning at 19:51. EMS was called to the scene and CPR was continued. She received 5 injections of epinephrine, 1 dose of atropin and a bolus dose of of amiodarone. She was transported to the ED and CPR was performed in the ED for approximately another hour. Pulse was regained at 21:25. She was begun on a norepinephrine infusion at 4 mcg/min. She was transported to [facility] and the hypothermia protocol was initiated. ECG's obtained are overall quite unremarkable in that no significant ST T wave changes are seen.     Hospital Course:    Hypothermia protocol.    Interval History:     Off sedation after being rewarmed. No arrhythmias. Unresponsive.    Review of Systems   Unable to perform ROS: intubated     Objective:     Vital Signs (Most Recent):  Temp: 96.1 °F (35.6 °C) (12/20/17 0530)  Pulse: 74 (12/20/17 0630)  Resp: (!) 27 (12/20/17 0630)  BP: (!) 104/52 (12/20/17 0630)  SpO2: 100 % (12/20/17 0630) Vital Signs (24h Range):  Temp:   [95 °F (35 °C)-98.9 °F (37.2 °C)] 96.1 °F (35.6 °C)  Pulse:  [72-84] 74  Resp:  [27-28] 27  SpO2:  [98 %-100 %] 100 %  BP: ()/(34-60) 104/52     Weight: 110.3 kg (243 lb 2.7 oz)  Body mass index is 44.48 kg/m².    SpO2: 100 %  O2 Device (Oxygen Therapy): ventilator      Intake/Output Summary (Last 24 hours) at 12/20/17 0700  Last data filed at 12/20/17 0600   Gross per 24 hour   Intake          2462.87 ml   Output              890 ml   Net          1572.87 ml       Lines/Drains/Airways     Central Venous Catheter Line                 Percutaneous Central Line Insertion/Assessment - triple lumen  12/17/17 right femoral 3 days          Drain                 NG/OG Tube 12/17/17 orogastric 3 days         Urethral Catheter 12/17/17 3 days          Airway                 Airway - Non-Surgical 12/17/17 Endotracheal Tube 3 days          Peripheral Intravenous Line                 Peripheral IV - Single Lumen 12/17/17 Right Forearm 3 days                Physical Exam   Constitutional: She is sedated, chemically paralyzed and intubated.   Cardiovascular: Normal rate, regular rhythm, S1 normal and S2 normal.  Exam reveals gallop and S4.    Pulmonary/Chest: She is intubated. She has rhonchi.     Current Medications:     albuterol-ipratropium 2.5mg-0.5mg/3mL  3 mL Nebulization Q4H    chlorhexidine  15 mL Mouth/Throat BID    enoxaparin  40 mg Subcutaneous Daily    methylPREDNISolone sodium succinate  40 mg Intravenous Daily    pantoprazole  40 mg Intravenous BID    custom IVPB builder   Intravenous Q8H     Current Laboratory Results:    Recent Results (from the past 24 hour(s))   Glucose, random (Unless on insulin infusion)    Collection Time: 12/19/17  8:10 AM   Result Value Ref Range    Glucose 479 (HH) 70 - 110 mg/dL   Basic metabolic panel    Collection Time: 12/19/17  8:10 AM   Result Value Ref Range    Sodium 133 (L) 136 - 145 mmol/L    Potassium 3.4 (L) 3.5 - 5.1 mmol/L    Chloride 103 95 - 110 mmol/L    CO2 17  (L) 23 - 29 mmol/L    Glucose 479 (HH) 70 - 110 mg/dL    BUN, Bld 37 (H) 8 - 23 mg/dL    Creatinine 2.2 (H) 0.5 - 1.4 mg/dL    Calcium 7.1 (L) 8.7 - 10.5 mg/dL    Anion Gap 13 8 - 16 mmol/L    eGFR if African American 24 (A) >60 mL/min/1.73 m^2    eGFR if non African American 21 (A) >60 mL/min/1.73 m^2   Magnesium    Collection Time: 12/19/17  8:10 AM   Result Value Ref Range    Magnesium 1.1 (L) 1.6 - 2.6 mg/dL   Phosphorus    Collection Time: 12/19/17  8:10 AM   Result Value Ref Range    Phosphorus 2.6 (L) 2.7 - 4.5 mg/dL   Calcium, ionized    Collection Time: 12/19/17  8:10 AM   Result Value Ref Range    Calcium, Ion 0.99 (L) 1.06 - 1.42 mmol/L   Troponin I    Collection Time: 12/19/17  8:10 AM   Result Value Ref Range    Troponin I 0.620 (H) 0.000 - 0.026 ng/mL   Vancomycin, random    Collection Time: 12/19/17  8:10 AM   Result Value Ref Range    Vancomycin, Random 37.3 Not established ug/mL   POCT glucose    Collection Time: 12/19/17 12:54 PM   Result Value Ref Range    POCT Glucose 390 (H) 70 - 110 mg/dL   POCT glucose    Collection Time: 12/19/17  2:03 PM   Result Value Ref Range    POCT Glucose 373 (H) 70 - 110 mg/dL   POCT glucose    Collection Time: 12/19/17  3:06 PM   Result Value Ref Range    POCT Glucose 396 (H) 70 - 110 mg/dL   POCT glucose    Collection Time: 12/19/17  4:01 PM   Result Value Ref Range    POCT Glucose 384 (H) 70 - 110 mg/dL   POCT glucose    Collection Time: 12/19/17  6:12 PM   Result Value Ref Range    POCT Glucose 450 (HH) 70 - 110 mg/dL   POCT glucose    Collection Time: 12/19/17  7:15 PM   Result Value Ref Range    POCT Glucose 387 (H) 70 - 110 mg/dL   Basic metabolic panel    Collection Time: 12/19/17  7:31 PM   Result Value Ref Range    Sodium 136 136 - 145 mmol/L    Potassium 3.2 (L) 3.5 - 5.1 mmol/L    Chloride 106 95 - 110 mmol/L    CO2 19 (L) 23 - 29 mmol/L    Glucose 362 (H) 70 - 110 mg/dL    BUN, Bld 43 (H) 8 - 23 mg/dL    Creatinine 2.4 (H) 0.5 - 1.4 mg/dL    Calcium 7.6  (L) 8.7 - 10.5 mg/dL    Anion Gap 11 8 - 16 mmol/L    eGFR if African American 22 (A) >60 mL/min/1.73 m^2    eGFR if non African American 19 (A) >60 mL/min/1.73 m^2   POCT glucose    Collection Time: 12/19/17  8:31 PM   Result Value Ref Range    POCT Glucose 374 (H) 70 - 110 mg/dL   POCT glucose    Collection Time: 12/19/17  9:39 PM   Result Value Ref Range    POCT Glucose 342 (H) 70 - 110 mg/dL   POCT glucose    Collection Time: 12/19/17 10:21 PM   Result Value Ref Range    POCT Glucose 291 (H) 70 - 110 mg/dL   POCT glucose    Collection Time: 12/19/17 11:15 PM   Result Value Ref Range    POCT Glucose 369 (H) 70 - 110 mg/dL   POCT glucose    Collection Time: 12/20/17 12:17 AM   Result Value Ref Range    POCT Glucose 226 (H) 70 - 110 mg/dL   Basic metabolic panel    Collection Time: 12/20/17  1:25 AM   Result Value Ref Range    Sodium 137 136 - 145 mmol/L    Potassium 3.6 3.5 - 5.1 mmol/L    Chloride 109 95 - 110 mmol/L    CO2 18 (L) 23 - 29 mmol/L    Glucose 213 (H) 70 - 110 mg/dL    BUN, Bld 45 (H) 8 - 23 mg/dL    Creatinine 2.4 (H) 0.5 - 1.4 mg/dL    Calcium 7.6 (L) 8.7 - 10.5 mg/dL    Anion Gap 10 8 - 16 mmol/L    eGFR if African American 22 (A) >60 mL/min/1.73 m^2    eGFR if non African American 19 (A) >60 mL/min/1.73 m^2   Magnesium    Collection Time: 12/20/17  1:28 AM   Result Value Ref Range    Magnesium 1.7 1.6 - 2.6 mg/dL   POCT glucose    Collection Time: 12/20/17  1:37 AM   Result Value Ref Range    POCT Glucose 227 (H) 70 - 110 mg/dL   POCT glucose    Collection Time: 12/20/17  2:40 AM   Result Value Ref Range    POCT Glucose 249 (H) 70 - 110 mg/dL   POCT glucose    Collection Time: 12/20/17  3:49 AM   Result Value Ref Range    POCT Glucose 188 (H) 70 - 110 mg/dL   Comprehensive metabolic panel    Collection Time: 12/20/17  4:03 AM   Result Value Ref Range    Sodium 137 136 - 145 mmol/L    Potassium 3.5 3.5 - 5.1 mmol/L    Chloride 109 95 - 110 mmol/L    CO2 18 (L) 23 - 29 mmol/L    Glucose 184 (H) 70  - 110 mg/dL    BUN, Bld 46 (H) 8 - 23 mg/dL    Creatinine 2.4 (H) 0.5 - 1.4 mg/dL    Calcium 7.6 (L) 8.7 - 10.5 mg/dL    Total Protein 4.8 (L) 6.0 - 8.4 g/dL    Albumin 1.9 (L) 3.5 - 5.2 g/dL    Total Bilirubin 0.4 0.1 - 1.0 mg/dL    Alkaline Phosphatase 84 55 - 135 U/L     (H) 10 - 40 U/L    ALT 68 (H) 10 - 44 U/L    Anion Gap 10 8 - 16 mmol/L    eGFR if African American 22 (A) >60 mL/min/1.73 m^2    eGFR if non African American 19 (A) >60 mL/min/1.73 m^2   CBC auto differential    Collection Time: 12/20/17  4:03 AM   Result Value Ref Range    WBC 10.82 3.90 - 12.70 K/uL    RBC 3.32 (L) 4.00 - 5.40 M/uL    Hemoglobin 9.2 (L) 12.0 - 16.0 g/dL    Hematocrit 27.3 (L) 37.0 - 48.5 %    MCV 82 82 - 98 fL    MCH 27.7 27.0 - 31.0 pg    MCHC 33.7 32.0 - 36.0 g/dL    RDW 14.2 11.5 - 14.5 %    Platelets 63 (L) 150 - 350 K/uL    MPV 9.8 9.2 - 12.9 fL    Gran # 9.7 (H) 1.8 - 7.7 K/uL    Lymph # 0.6 (L) 1.0 - 4.8 K/uL    Mono # 0.5 0.3 - 1.0 K/uL    Eos # 0.0 0.0 - 0.5 K/uL    Baso # 0.00 0.00 - 0.20 K/uL    Gran% 89.7 (H) 38.0 - 73.0 %    Lymph% 5.8 (L) 18.0 - 48.0 %    Mono% 4.5 4.0 - 15.0 %    Eosinophil% 0.0 0.0 - 8.0 %    Basophil% 0.0 0.0 - 1.9 %    Platelet Estimate Decreased (A)     Poik Slight     [place] Cells Occasional     Differential Method Automated    Magnesium    Collection Time: 12/20/17  4:03 AM   Result Value Ref Range    Magnesium 1.6 1.6 - 2.6 mg/dL   Phosphorus    Collection Time: 12/20/17  4:03 AM   Result Value Ref Range    Phosphorus 1.9 (L) 2.7 - 4.5 mg/dL   CK    Collection Time: 12/20/17  4:03 AM   Result Value Ref Range    CPK 2991 (H) 20 - 180 U/L   POCT glucose    Collection Time: 12/20/17  4:52 AM   Result Value Ref Range    POCT Glucose 174 (H) 70 - 110 mg/dL   ISTAT PROCEDURE    Collection Time: 12/20/17  4:57 AM   Result Value Ref Range    POC PH 7.420 7.35 - 7.45    POC PCO2 29.5 (L) 35 - 45 mmHg    POC PO2 140 (H) 80 - 100 mmHg    POC HCO3 19.1 (L) 24 - 28 mmol/L    POC BE -5 -2 to 2  mmol/L    POC SATURATED O2 99 95 - 100 %    Rate 28     Sample ARTERIAL     Site RR     Allens Test Pass     DelSys Adult Vent     Mode AC/PRVC     Vt 450     PEEP 5     FiO2 40     ETCO2 19     Sp02 100    POCT glucose    Collection Time: 12/20/17  5:38 AM   Result Value Ref Range    POCT Glucose 239 (H) 70 - 110 mg/dL   POCT glucose    Collection Time: 12/20/17  6:31 AM   Result Value Ref Range    POCT Glucose 202 (H) 70 - 110 mg/dL     Current Imaging Results:    Imaging Results          X-Ray Chest 1 View (Final result)  Result time 12/20/17 06:29:55    Final result by Leann Lacy MD (12/20/17 06:29:55)                 Impression:      No significant interval detrimental change in cardiopulmonary status compared to prior examination.      Electronically signed by: LEANN LACY  Date:     12/20/17  Time:    06:29              Narrative:    Comparison: 12/19/2017    Technique: Single AP chest radiograph.    Findings: Endotracheal tube and enteric tube project in unchanged radiographic position. The cardiomediastinal silhouette is stable.  There is increased opacity in the lower lung zones (right greater than left) consistent with small right and trace left pleural effusion with associated by basilar atelectasis/consolidation.  There is no evidence of pneumothorax.                             CT Head Without Contrast (Final result)     Abnormal  Result time 12/19/17 16:57:53    Final result by Uzma Harrison MD (12/19/17 16:57:53)                 Impression:        Diffuse cerebral edema type pattern with effacement of cerebral sulci, basilar cisterns, cerebellar folia, and of the 4th ventricle.  Associated suggestion of crowding of the foramen magnum.    Likely superimposed remote right frontal and left parietal infarcts.    This report has been flagged in the [facility] medical record.      Electronically signed by: UZMA HARRISON  Date:     12/19/17  Time:    16:57              Narrative:    CLINICAL INDICATION: 77 year  old F with cardiac arrest status post cooling     TECHNIQUE: CT head without contrast. Axial CT images obtained throughout the head without intravenous contrast. Sagittal and coronal reconstructions were performed.    COMPARISON: No priors.    FINDINGS:    Intracranial compartment:    There is diffuse supratentorial and infratentorial parenchymal hypoattenuation with loss of gray-white differentiation and diffuse effacement of cerebral sulci, basilar cisterns, cerebellar folia, and of the 4th ventricle.  There is associated suggestion of crowding of the foramen magnum.  More focal areas of right frontal and left parietal hypoattenuation are suggestive of encephalomalacia/gliosis from prior insult.       Skull/extracranial contents (limited evaluation): No fracture. Mild patchy mucosal thickening is present in the sphenoid sinuses and ethmoid air cells.                             X-Ray Chest 1 View (Final result)  Result time 12/19/17 08:31:17    Final result by Uzma Harrison MD (12/19/17 08:31:17)                 Impression:      Satisfactory positioning of support devices.    Unchanged pulmonary edema.  Small right pleural effusion, trace left pleural effusion, and associated bibasilar airspace disease.      Electronically signed by: UZMA HARRISON  Date:     12/19/17  Time:    08:31              Narrative:    CLINICAL HISTORY:  ETT    TECHNIQUE: Single view portable frontal radiograph of the chest    COMPARISON: Chest radiograph 12/18/17      FINDINGS:  Support devices: Endotracheal tube tip overlies the midthoracic trachea.  Enteric tube distally overlies the stomach with the tip below the field-of-view.    Chest: Cardiomediastinal silhouette is normal.  Mild hazy opacification diffusely throughout the lungs is unchanged.  There is a small right pleural effusion with associated hazy right basilar airspace opacification.  Suggestion of a trace left pleural effusion with mild subsegmental atelectasis in the left lung  base.  No pneumothorax.    Upper Abdomen: Normal.    Other: N/A.                             US Lower Extremity Veins Bilateral (Final result)  Result time 12/18/17 17:22:18    Final result by Eulogio Gold MD (12/18/17 17:22:18)                 Impression:      1. Limited evaluation of the right lower extremity venous secondary to multiple overlying artifacts.  No evidence of deep vein thrombosis in the visualized popliteal, posterior tibialis, and peroneal veins on the right.    2.  No evidence of DVT in the left lower extremity.      Electronically signed by: EULOGIO GOLD MD  Date:     12/18/17  Time:    17:22              Narrative:    [id number]  12/18/17  15:53:27 [id number] ([facility]) : US LOWER EXTREMITY VEINS BILATERAL    SUPPLIED CLINICAL HISTORY:  rule out DVT    ADDITIONAL CLINICAL HISTORY: N/A    TECHNIQUE: Duplex and color flow Doppler evaluation of the bilateral lower extremities.    COMPARISON: None.    FINDINGS:  The right common femoral vein, right femoral vein, greater saphenous vein, and the anterior tibial is not well visualized due to multiple overlying artifacts.  The right popliteal vein, right posterior tibialis vein, and the right peroneal vein are within normal limits.      Left lower extremity has  no evidence of acute venous thrombosis in the common femoral, superficial femoral, greater saphenous, popliteal, peroneal, anterior tibial, and posterior tibial veins.                             X-Ray Abdomen Portable (Final result)  Result time 12/18/17 04:28:00    Final result by Osmar Felton MD (12/18/17 04:28:00)                 Impression:     Nonspecific gaseous distention of large and small bowel, but overall nonobstructive bowel gas pattern.      Electronically signed by: Osmar Felton  Date:     12/18/17  Time:    04:28              Narrative:    EXAM: ABDOMEN 1 VIEW    CLINICAL INDICATION:  Abdominal distention..    COMPARISON:  None.    TECHNIQUE:  Single AP supine view of the abdomen  was obtained.    FINDINGS:  Nonspecific gaseous distention of large and small bowel, in an overall nonobstructive pattern.  Nasogastric tube terminates in the body of the stomach. Right femoral central venous catheter is present. Grady catheter is also noted. No osseous abnormalities are seen.                             X-ray chest AP portable (Final result)  Result time 12/18/17 04:26:09    Final result by Osmar Felton MD (12/18/17 04:26:09)                 Impression:        Satisfactory positioning of endotracheal and endogastric tubes.    No detrimental change in lung aeration.      Electronically signed by: Osmar Felton  Date:     12/18/17  Time:    04:26              Narrative:    CLINICAL INFORMATION: Cardiac arrest.    COMPARISON: 01/14/2017.    FINDINGS: One view of the chest was obtained.  The patient is slightly rotated. Cardiac wires overlie the chest. Endotracheal tube is approximately 2.5 cm above the stephanie. Nasogastric tube terminates below the diaphragm and out of the field of view. Cardiac silhouette is not enlarged.  No significant airspace disease.  Bilateral pleural effusions, right side greater than left, similar in appearance to the prior exam.                                Assessment and Plan:     Problem List:    Active Diagnoses:    Diagnosis Date Noted POA    PRINCIPAL PROBLEM:  Sudden cardiac arrest [I46.9] 12/18/2017 Yes    (HFpEF) heart failure with preserved ejection fraction [I50.30] 09/03/2016 Yes    COPD (chronic obstructive pulmonary disease) [J44.9] 11/18/2014 Yes     Chronic    Type 2 diabetes mellitus with stage 4 chronic kidney disease, with long-term current use of insulin [E11.22, N18.4, Z79.4] 11/18/2014 Not Applicable    Essential hypertension [I10] 11/18/2014 Yes    Acute-on-chronic respiratory failure [J96.20] 11/18/2014 Yes      Problems Resolved During this Admission:    Diagnosis Date Noted Date Resolved POA     Date of Procedure: 12/18/2017    TEST  DESCRIPTION       Aorta: The aortic root is normal in size, measuring 2.7 cm at sinotubular junction and 3.1 cm at Sinuses of Valsalva. The proximal ascending aorta is normal in size, measuring 2.7 cm across.     Left Atrium: The left atrium is normal in size, measuring 3.5 cm across in the parasternal view.     Left Ventricle: The left ventricle is normal in size, with an end-diastolic diameter of 3.9 cm, and an end-systolic diameter of 2.8 cm. Wall thickness is increased, with the septum measuring 1.3 cm and the posterior wall measuring 1.2 cm across. Relative   wall thickness was increased at 0.62, and the LV mass index was 95.8 g/m2 consistent with concentric remodeling. There are no regional wall motion abnormalities. Left ventricular systolic function appears normal. Visually estimated ejection fraction is   55-60%.     Diastolic indices: E wave velocity 0.7 m/s, E/A ratio 0.8,  msec., E/e' ratio(avg) 14. There is diastolic dysfunction secondary to relaxation abnormality.     Right Atrium: The right atrium is normal in size.     Right Ventricle: The right ventricle is normal in size measuring 2.2 cm at the base in the apical right ventricle-focused view. Global right ventricular systolic function appears normal.     Intracavitary: There is no evidence of pericardial effusion, intracavity mass, thrombi, or vegetation.     This document has been electronically    SIGNED BY: Hussain Solis MD On: 12/19/2017 11:34      Assessment and Plan:     1. Cardiopulmonary Arrest              19:25 Cardiopulmonary arrest while getting respiratory treatment. Troponin to 2.   12/18/2017: Echo: Normal left ventricular size and systolic function. Mild LVH. No WMA.   12/18/2017: CT head: Diffuse cerebral edema.              Suspected primary respiratory event following CVA.              Acute myocardial infarction appears less likely with no acute ST T wave changes.               No need for me to  follow,    Thanks,      VTE Risk Mitigation         Ordered     enoxaparin injection 40 mg  Daily     Route:  Subcutaneous        12/18/17 1330     Medium Risk of VTE  Once      12/18/17 0304     Place sequential compression device  Until discontinued      12/18/17 0304          Yan Lucero MD  Cardiology  [facility]

## 2017-12-20 NOTE — PROGRESS NOTES
[facility] notified of patient's prognosis; organ no longer following; will call with time of death once care is withdrawn

## 2017-12-21 NOTE — PLAN OF CARE
---------------------------------------    ATTN: TEAM [facility] PLANNING    ON VENT IN ICU      RECOMMENDATION  : SPIRITUAL CARE CONSULT  FOR PT/FAMILY    IF ANY NEEDS ARISE PLEASE CONTACT RN CM /[facility] TEAM      NARCISA BE ON CASE ALSO # [id number]     Zahira Greer RN  Case management# [phone] (FAX) [phone]        12/21/17 1110   Discharge Assessment   Assessment Type Discharge Planning Reassessment

## 2017-12-21 NOTE — SUBJECTIVE & OBJECTIVE
Interval History:  Events of yesterday with EEG showing no cerebral activity and CT with findings of irreversible ischemia.  Neurologist recommended apnea test and patient took 2 small breaths, so will remain on the ventilator until family can gather at their request.     Review of Systems   Unable to perform ROS: Patient unresponsive     Objective:     Vital Signs (Most Recent):  Temp: 98.1 °F (36.7 °C) (12/20/17 1505)  Pulse: 80 (12/20/17 1736)  Resp: 16 (12/20/17 1736)  BP: (!) 86/43 (12/20/17 1736)  SpO2: 100 % (12/20/17 1736) Vital Signs (24h Range):  Temp:  [96.1 °F (35.6 °C)-98.2 °F (36.8 °C)] 98.1 °F (36.7 °C)  Pulse:  [72-82] 80  Resp:  [15-28] 16  SpO2:  [98 %-100 %] 100 %  BP: ()/(42-58) 86/43     Weight: 110.3 kg (243 lb 2.7 oz)  Body mass index is 44.48 kg/m².    Intake/Output Summary (Last 24 hours) at 12/20/17 1801  Last data filed at 12/20/17 1700   Gross per 24 hour   Intake          2542.87 ml   Output              925 ml   Net          1617.87 ml      Physical Exam   Constitutional: She appears well-developed.   Obese woman unresponsive on ventilator.   HENT:   Head: Normocephalic.   Eyes: Conjunctivae are normal.   Neck: Neck supple. No thyromegaly present.   Cardiovascular: Normal rate, regular rhythm, normal heart sounds and intact distal pulses.  Exam reveals no gallop and no friction rub.    No murmur heard.  Pulmonary/Chest: She has no wheezes. She has no rales.   Breathing unlabored on ventilator.  Coarse ventilator noise appreciated.   Abdominal: Soft. She exhibits distension. There is no tenderness.   Bowel sounds decreased.   Musculoskeletal: She exhibits no edema.   Lymphadenopathy:     She has no cervical adenopathy.   Neurological:   Unresponsive to voice or touch.  Not following commands.  No oculocephalic reflex noted.   Skin: Skin is warm and dry. No rash noted.       Significant Labs: All pertinent labs within the past 24 hours have been reviewed.    Significant Imaging: I  have reviewed all pertinent imaging results/findings within the past 24 hours.

## 2017-12-21 NOTE — ASSESSMENT & PLAN NOTE
- 20 minutes of bystander CPR in field and 1 hour of coding with EMS  - Requiring pressor support  - Hypothermia protocol complete and no evidence of recovery  - EEG without brain activity  - Family still reluctant to accept patient's passing and she will remain on ventilator until they can be together.

## 2017-12-21 NOTE — PLAN OF CARE
Problem: Ventilation, Mechanical Invasive (Adult)  Goal: Signs and Symptoms of Listed Potential Problems Will be Absent, Minimized or Managed (Ventilation, Mechanical Invasive)  Signs and symptoms of listed potential problems will be absent, minimized or managed by discharge/transition of care (reference Ventilation, Mechanical Invasive (Adult) CPG).   Outcome: Ongoing (interventions implemented as appropriate)  Patient received on ventilator with settings as documented. Patient remains unresponsive. NO changes made this shift. Patient in no apparent distress. Will continue to monitor.

## 2017-12-21 NOTE — PLAN OF CARE
Problem: Ventilation, Mechanical Invasive (Adult)  Goal: Signs and Symptoms of Listed Potential Problems Will be Absent, Minimized or Managed (Ventilation, Mechanical Invasive)  Signs and symptoms of listed potential problems will be absent, minimized or managed by discharge/transition of care (reference Ventilation, Mechanical Invasive (Adult) CPG).   Outcome: Ongoing (interventions implemented as appropriate)  Pt remains on vent on documented settings with no change in respiratory status.

## 2017-12-21 NOTE — PROGRESS NOTES
Pulmonary & Critical Care Medicine Progress Note    Subjective:   No issues overnight. EEG yesterday without brain activity.     Vital Signs:   Vitals:    12/21/17 1105   BP: (!) 96/50   Pulse: 64   Resp: 18   Temp: 97.5 °F (36.4 °C)       Fluid Balance:     Intake/Output Summary (Last 24 hours) at 12/21/17 1403  Last data filed at 12/21/17 0500   Gross per 24 hour   Intake          1842.25 ml   Output              170 ml   Net          1672.25 ml       Physical Exam:   General: NAD, intubated  HEENT: AT/NC, PERRL, EOMI, nasal and oral mucosa moist. ETT in place  Neck: Supple without JVD. Trachea midline. Thyroid feels normal.   Cardiac: RRR with no MRG with brisk cap refill and symmetric pulses in distal extremities.  Respiratory: Normal inspection. Symmetric chest rise.  Auscultation coarse bilaterally with resolution of wheezing bilaterally. No increased work of breathing noted.   Abdomen: Soft, NT/ND. +BS.   Extremities: Normal strength and tone (grossly). No visible atrophy. No clubbing or cyanosis on nail exam.   Skin: Warm and dry without visible rash. Femoral CVL in place on the right.  Neuro: No Gag, no corneal reflex, breathing above the vent, pupils non-reactive and small but not pinpoint, not responsive to pain but does move left fingers randomly    Laboratory Studies:     Recent Labs  Lab 12/21/17  1201   PH 7.317*   PCO2 41.5   PO2 554*   HCO3 21.2*   POCSATURATED 100   BE -5       Recent Labs  Lab 12/21/17  0434   WBC 8.55   RBC 3.04*   HGB 8.4*   HCT 26.1*   PLT 58*   MCV 86   MCH 27.6   MCHC 32.2       Recent Labs  Lab 12/21/17  0434      K 4.4   *   CO2 19*   BUN 50*   CREATININE 3.2*   MG 1.5*       Microbiology Data:   Microbiology Results (last 7 days)     Procedure Component Value Units Date/Time    Blood culture [[id number]] Collected:  12/18/17 0740    Order Status:  Completed Specimen:  Blood Updated:  12/21/17 1212     Blood Culture, Routine No Growth to date     Blood Culture,  Routine No Growth to date     Blood Culture, Routine No Growth to date     Blood Culture, Routine No Growth to date    Blood culture [[id number]] Collected:  12/18/17 1040    Order Status:  Completed Specimen:  Blood Updated:  12/20/17 1412     Blood Culture, Routine No Growth to date     Blood Culture, Routine No Growth to date     Blood Culture, Routine No Growth to date    Culture, Respiratory with Gram Stain [[id number]] Collected:  12/18/17 1745    Order Status:  Completed Specimen:  Respiratory from Sputum Updated:  12/20/17 1306     Respiratory Culture --     CANDIDA ALBICANS  Moderate  Normal respiratory hari also present       Gram Stain (Respiratory) <10 epithelial cells per low power field.     Gram Stain (Respiratory) Rare WBC's     Gram Stain (Respiratory) Moderate Gram negative rods     Gram Stain (Respiratory) Rare Gram positive cocci     Gram Stain (Respiratory) Rare Gram negative diplococci    Urine culture [[id number]] Collected:  12/18/17 0535    Order Status:  Completed Specimen:  Urine from Urine, Catheterized Updated:  12/19/17 0832     Urine Culture, Routine No growth           Chest Imaging:   No new imaging.     Infusions:     sodium chloride 0.9% 100 mL/hr at 12/21/17 0531    insulin (HUMAN R) infusion (adults) 1.3 Units/hr (12/21/17 0225)       Scheduled Medications:    chlorhexidine  15 mL Mouth/Throat BID    enoxaparin  40 mg Subcutaneous Daily    pantoprazole  40 mg Intravenous BID       PRN Medications:   dextrose 50%, glucagon (human recombinant), insulin aspart, sodium chloride 0.9%    Assessment & Plan:   Patient Active Problem List   Diagnosis    Acute-on-chronic respiratory failure    COPD (chronic obstructive pulmonary disease)    Type 2 diabetes mellitus with stage 4 chronic kidney disease, with long-term current use of insulin    Essential hypertension    (HFpEF) heart failure with preserved ejection fraction    HLD (hyperlipidemia)    CAP (community acquired  pneumonia)    Sudden cardiac arrest    Cardiac arrest    Leukocytosis    Hyperglycemia    Brain death      Ms. Sharmila Campos is a 78 y/o female, with PMH of COPD, HFpEF, HTN, HLD, IDDM, CVA, MI here s/p cardiac arrest (unknown rhythms) due to presumed hypoxic respiratory failure secondary to COPD exacerbation though cause still unclear with ROSC > 1 hour     Neuro- CT head with impending herniation: Diffuse cerebral edema type pattern with effacement of cerebral sulci, basilar cisterns, cerebellar folia, and of the 4th ventricle.  Associated suggestion of crowding of the foramen magnum. Likely superimposed remote right frontal and left parietal infarcts.EEG with no brain activity. Neuro consulted and their exam was consistent with brain death-- I am unable to perform the apnea test due to SBP persistently lower that 100 despite IVF bolus.     CV-  now off levophed (maintain MAP >65); hypothermia protocol ended 12/20.      Resp- Minimal vent settings.     Heme/ID-  vanc supratherapeutic; cultures negative. WBC normal. Vanc random this AM. On zosyn- discontinued since cultures have been negative.    Renal- BUN/Cr stable and UOP 30-40cc/hr which is adequate.     Endocrine- hyperglycemia resolved     PPx- PPI + lovenox    Code status changed to partial due to already intubated- but no chest compressions or ACLS if codes. No vasopressor support. Called to discuss overall exam and change with exam (not breathing spontaneously) with daughter today and she understands. BP too low to perform apnea test to confirm brain death exam. Daughter will have all family come tomorrow and plan to withdraw care.     Alley Bright MD  [facility] Fellow

## 2017-12-21 NOTE — PLAN OF CARE
Problem: Airway, Artificial (Adult)  Goal: Signs and Symptoms of Listed Potential Problems Will be Absent, Minimized or Managed (Airway, Artificial)  Signs and symptoms of listed potential problems will be absent, minimized or managed by discharge/transition of care (reference Airway, Artificial (Adult) CPG).   Outcome: Ongoing (interventions implemented as appropriate)  Pt remains on documented settings orally intubated with 7.5 ETT secured with tube merrill at 25 cm yonis at lip.Neuro test done again today ABG x 2 done. Await final apnea test.Will continue to monitor.

## 2017-12-21 NOTE — PROGRESS NOTES
[facility] Hospital Medicine  Progress Note    Patient Name: Sharmila Campos  MRN: [id number]  Patient Class: IP- Inpatient   Admission Date: 12/18/2017  Length of Stay: 3 days  Attending Physician: Jeannine Xie MD  Primary Care Provider: Noah Jimenez MD        Subjective:     Principal Problem:Sudden cardiac arrest    HPI:  Ms. Campos is a 77 year old woman who was transferred here from [facility] on 12/17 after an episode of cardiac arrest at home while she was taking a breathing treatment.  Bystanders performed CPR for about 20 minutes and when EMS arrived she continued with CPR, 5 rounds of epinephrine, 1 dose of atropine and a dose of amiodarone.  CPR continued in the ED for about 5 minutes and an additional dose of epinephrine was given before she regained her pulse.  She was started on norepinephrine and vancomycin and transferred to [facility] on the ventilator.    Patient's medical history is notable for COPD, chronic diastolic heart failure, diabetes, HTN and previous stroke and MI.    Hospital Course:  Patient was admitted to ICU and a cooling protocol was initiated.  Her lactic acid level was elevated without a source of infection noted, and she was treated with IV Zosyn presumptively while awaiting urine and blood cultures that proved to be negative.  An EEG was done on 12/19 and showed no brain activity.  This was followed by a CT scan that showed diffuse cerebral edema and crowding of the foramen magnum consistent with impending herniation.  In addition it showed previous strokes in the right frontal and left parietal regions.    Patient's family were hoping for a miracle recovery, but eventually agreed to no chest compressions or shocks on the patient's passing.  She was seen by the neurologist to determine brain death criteria, and he found she had no sign of cortical or brainstem function on exam.  Recommended an apnea test.  She remained on the ventilator  while the family gathered for their last visits.    Interval History:  Completely unresponsive    Review of Systems   Unable to perform ROS: Patient unresponsive     Objective:     Vital Signs (Most Recent):  Temp: 97.5 °F (36.4 °C) (12/21/17 1105)  Pulse: 66 (12/21/17 1711)  Resp: 20 (12/21/17 1711)  BP: (!) 94/55 (12/21/17 1400)  SpO2: 100 % (12/21/17 1711) Vital Signs (24h Range):  Temp:  [97.5 °F (36.4 °C)-98.6 °F (37 °C)] 97.5 °F (36.4 °C)  Pulse:  [60-80] 66  Resp:  [14-20] 20  SpO2:  [99 %-100 %] 100 %  BP: ()/(42-55) 94/55     Weight: 112 kg (246 lb 14.6 oz)  Body mass index is 45.16 kg/m².    Intake/Output Summary (Last 24 hours) at 12/21/17 1746  Last data filed at 12/21/17 0500   Gross per 24 hour   Intake          1822.25 ml   Output              115 ml   Net          1707.25 ml      Physical Exam   Constitutional: She appears well-developed.   Obese woman unresponsive on ventilator.   HENT:   Head: Normocephalic.   Eyes: Conjunctivae are normal.   Neck: Neck supple. No thyromegaly present.   Cardiovascular: Normal rate, regular rhythm, normal heart sounds and intact distal pulses.  Exam reveals no gallop and no friction rub.    No murmur heard.  Pulmonary/Chest: She has no wheezes. She has no rales.   Breathing unlabored on ventilator.  Coarse ventilator noise appreciated.   Abdominal: Soft. She exhibits distension. There is no tenderness.   Bowel sounds decreased.   Musculoskeletal: She exhibits no edema.   Lymphadenopathy:     She has no cervical adenopathy.   Neurological:   Unresponsive to voice or touch.  Not following commands.  No oculocephalic reflex noted.   Skin: Skin is warm and dry. No rash noted.       Significant Labs: All pertinent labs within the past 24 hours have been reviewed.    Significant Imaging: I have reviewed all pertinent imaging results/findings within the past 24 hours.    Assessment/Plan:      * Sudden cardiac arrest    - 20 minutes of bystander CPR in field and 1  hour of coding with EMS  - Requiring pressor support  - Hypothermia protocol complete and no evidence of recovery  - EEG without brain activity  - Family still reluctant to accept patient's passing and she will remain on ventilator until they can be together.        Acute-on-chronic respiratory failure    - History of COPD on home oxygen  - Intubated and on ventilator   - Pulmonary/critical care following        Type 2 diabetes mellitus with stage 4 chronic kidney disease, with long-term current use of insulin    - Patient hyperglycemic   - She was on detemir 35 U qhs and sliding scale at home  - A1C 10.3  - Continue SSI - Accuchecks are starting to decrease while patient NPO and expect this trend to continue  - Poor renal function at baseline, but making urine        Hyperglycemia    - glucose in ED of 464  - glucose upon arrival to ICU of 233  - IV fluids at 125 cc/hour   - no apparent DKA at this time   - Patient NPO with SSRI for NPO patients   - unclear when patient takes long acting insulin; ordered for PM administration   - monitor           Leukocytosis    - WBC count of 16.3 in ED, reduced to   - etiology unclear at this time    - CXR in this facility without active infection   - UA not convincing for UTI  - patient started on vancomycin   - vancomycin panel ordered   - blood cultures drawn at [facility]   - repeat blood cultures drawn   - lactate elevated at [facility] to 5.52   - patient arrived with IV fluids at 250 cc/hour   - reduced iv fluids to 125 cc/hour 2/2 h/o heart failure with EF of 50% > 1 year ago   - repeat lactate measure ordered             HLD (hyperlipidemia)    - lipid panel ordered for AM   - last Lipid panel:   Results for ARNOL JOHNSONA (MRN [id number]) as of 12/18/2017 02:09   Ref. Range 9/4/2016 04:43   Cholesterol Latest Ref Range: 120 - 199 mg/dL 154   HDL Latest Ref Range: 40 - 75 mg/dL 43   LDL Cholesterol Latest Ref Range: 63.0 - 159.0 mg/dL 99.2   Total Cholesterol/HDL  Ratio Latest Ref Range: 2.0 - 5.0  3.6   Triglycerides Latest Ref Range: 30 - 150 mg/dL 59           (HFpEF) heart failure with preserved ejection fraction    - 2D echo with diastolic dysfunction only          Essential hypertension    - Currently requiring pressor support.        COPD (chronic obstructive pulmonary disease)    - Cardiac arrest occurred in conjunction with patient giving herself a respiratory treatment  - follows with pulmonology: Dr. Ventura  - as of 1/2017 she was on home oxygen 2 liters            VTE Risk Mitigation         Ordered     enoxaparin injection 40 mg  Daily     Route:  Subcutaneous        12/18/17 1330     Medium Risk of VTE  Once      12/18/17 0304     Place sequential compression device  Until discontinued      12/18/17 0304              Jeannine Wilhelm MD  Department of Hospital Medicine   [facility]

## 2017-12-21 NOTE — PLAN OF CARE
Problem: Patient Care Overview  Goal: Plan of Care Review  Outcome: Ongoing (interventions implemented as appropriate)  VSS stable.  Vent maintained.  CBG Q1H, insulin titrated per algorithm.  UOP inadequate; MD aware, no new orders.  Repositioned Q2H.  Will monitor.

## 2017-12-21 NOTE — SUBJECTIVE & OBJECTIVE
Interval History:  Completely unresponsive    Review of Systems   Unable to perform ROS: Patient unresponsive     Objective:     Vital Signs (Most Recent):  Temp: 97.5 °F (36.4 °C) (12/21/17 1105)  Pulse: 66 (12/21/17 1711)  Resp: 20 (12/21/17 1711)  BP: (!) 94/55 (12/21/17 1400)  SpO2: 100 % (12/21/17 1711) Vital Signs (24h Range):  Temp:  [97.5 °F (36.4 °C)-98.6 °F (37 °C)] 97.5 °F (36.4 °C)  Pulse:  [60-80] 66  Resp:  [14-20] 20  SpO2:  [99 %-100 %] 100 %  BP: ()/(42-55) 94/55     Weight: 112 kg (246 lb 14.6 oz)  Body mass index is 45.16 kg/m².    Intake/Output Summary (Last 24 hours) at 12/21/17 1746  Last data filed at 12/21/17 0500   Gross per 24 hour   Intake          1822.25 ml   Output              115 ml   Net          1707.25 ml      Physical Exam   Constitutional: She appears well-developed.   Obese woman unresponsive on ventilator.   HENT:   Head: Normocephalic.   Eyes: Conjunctivae are normal.   Neck: Neck supple. No thyromegaly present.   Cardiovascular: Normal rate, regular rhythm, normal heart sounds and intact distal pulses.  Exam reveals no gallop and no friction rub.    No murmur heard.  Pulmonary/Chest: She has no wheezes. She has no rales.   Breathing unlabored on ventilator.  Coarse ventilator noise appreciated.   Abdominal: Soft. She exhibits distension. There is no tenderness.   Bowel sounds decreased.   Musculoskeletal: She exhibits no edema.   Lymphadenopathy:     She has no cervical adenopathy.   Neurological:   Unresponsive to voice or touch.  Not following commands.  No oculocephalic reflex noted.   Skin: Skin is warm and dry. No rash noted.       Significant Labs: All pertinent labs within the past 24 hours have been reviewed.    Significant Imaging: I have reviewed all pertinent imaging results/findings within the past 24 hours.

## 2017-12-21 NOTE — PLAN OF CARE
Problem: Patient Care Overview  Goal: Plan of Care Review  Outcome: Ongoing (interventions implemented as appropriate)  VSS. MD notified of hypotension @ baseline for shift. No new orders due to code status. Code status updated to partial code. Vent maintained. Remains unresponsive, pupils nonreactive. Urine output adequate. CBG monitoring Q1H - insulin gtt titrated according to algorithm per order. Repositioned Q2H. Family updated on condition of patient.Will continue to monitor.

## 2017-12-21 NOTE — ASSESSMENT & PLAN NOTE
- Patient hyperglycemic   - She was on detemir 35 U qhs and sliding scale at home  - A1C 10.3  - Continue SSI - Accuchecks are starting to decrease while patient NPO and expect this trend to continue  - Poor renal function at baseline, but making urine

## 2017-12-22 NOTE — PROGRESS NOTES
[facility] Hospital Medicine  Progress Note    Patient Name: Sharmila Campos  MRN: [id number]  Patient Class: IP- Inpatient   Admission Date: 12/18/2017  Length of Stay: 4 days  Attending Physician: Jeannine Xie MD  Primary Care Provider: Noah Jimenez MD        Subjective:     Principal Problem:Sudden cardiac arrest    HPI:  Ms. Campos is a 77 year old woman who was transferred here from [facility] on 12/17 after an episode of cardiac arrest at home while she was taking a breathing treatment.  Bystanders performed CPR for about 20 minutes and when EMS arrived she continued with CPR, 5 rounds of epinephrine, 1 dose of atropine and a dose of amiodarone.  CPR continued in the ED for about 5 minutes and an additional dose of epinephrine was given before she regained her pulse.  She was started on norepinephrine and vancomycin and transferred to [facility] on the ventilator.    Patient's medical history is notable for COPD, chronic diastolic heart failure, diabetes, HTN and previous stroke and MI.    Hospital Course:  Patient was admitted to ICU and a cooling protocol was initiated.  Her lactic acid level was elevated without a source of infection noted, and she was treated with IV Zosyn presumptively while awaiting urine and blood cultures that proved to be negative.  An EEG was done on 12/19 and showed no brain activity.  This was followed by a CT scan that showed diffuse cerebral edema and crowding of the foramen magnum consistent with impending herniation.  In addition it showed previous strokes in the right frontal and left parietal regions.    Patient's family were hoping for a miracle recovery, but eventually agreed to no chest compressions or shocks on the patient's passing.  She was seen by the neurologist to determine brain death criteria, and he found she had no sign of cortical or brainstem function on exam.  Recommended an apnea test, which patient failed.  She  remained on the ventilator while the family gathered for their last visits.      Interval History:  Plan for withdrawal of care today.    Review of Systems   Unable to perform ROS: Patient unresponsive     Objective:     Vital Signs (Most Recent):  Temp: 97.4 °F (36.3 °C) (12/22/17 0300)  Pulse: 60 (12/22/17 0732)  Resp: 20 (12/22/17 0732)  BP: (!) 107/56 (12/22/17 0600)  SpO2: 100 % (12/22/17 0732) Vital Signs (24h Range):  Temp:  [97.4 °F (36.3 °C)-97.7 °F (36.5 °C)] 97.4 °F (36.3 °C)  Pulse:  [58-70] 60  Resp:  [14-21] 20  SpO2:  [100 %] 100 %  BP: ()/(46-57) 107/56     Weight: 112 kg (246 lb 14.6 oz)  Body mass index is 45.16 kg/m².    Intake/Output Summary (Last 24 hours) at 12/22/17 0808  Last data filed at 12/22/17 0537   Gross per 24 hour   Intake          1527.64 ml   Output              835 ml   Net           692.64 ml      Physical Exam:  Unchanged    Significant Labs: All pertinent labs within the past 24 hours have been reviewed.    Significant Imaging: I have reviewed all pertinent imaging results/findings within the past 24 hours.    Assessment/Plan:      * Sudden cardiac arrest    - 20 minutes of bystander CPR in field and 1 hour of coding with EMS  - Pressors turned off  - Hypothermia protocol complete and no evidence of recovery  - EEG without brain activity  - Failed apnea test                            Brain death    Plan for withdrawal of care today.             Jeannine Wilhelm MD  Department of Hospital Medicine   [facility]

## 2017-12-22 NOTE — PROGRESS NOTES
Pulmonary & Critical Care Medicine Progress Note    Subjective:   No issues overnight. EEG without brain activity. Apnea test done today confirms brain death in addition to exam findings.     Vital Signs:   Vitals:    12/22/17 0925   BP:    Pulse: 82   Resp: (!) 0   Temp:        Fluid Balance:     Intake/Output Summary (Last 24 hours) at 12/22/17 0927  Last data filed at 12/22/17 0537   Gross per 24 hour   Intake          1527.64 ml   Output              835 ml   Net           692.64 ml       Physical Exam:   General: NAD, intubated  HEENT: AT/NC, PERRL, EOMI, nasal and oral mucosa moist. ETT in place  Neck: Supple without JVD. Trachea midline. Thyroid feels normal.   Cardiac: RRR with no MRG with brisk cap refill and symmetric pulses in distal extremities.  Respiratory: Normal inspection. Symmetric chest rise.  Auscultation coarse bilaterally with resolution of wheezing bilaterally. No increased work of breathing noted.   Abdomen: Soft, NT/ND. +BS.   Extremities: Normal strength and tone (grossly). No visible atrophy. No clubbing or cyanosis on nail exam.   Skin: Warm and dry without visible rash. Femoral CVL in place on the right.          Laboratory Studies:     Recent Labs  Lab 12/22/17  0918   PH 7.134*   PCO2 63.0*   PO2 550*   HCO3 21.1*   POCSATURATED 100   BE -8       Recent Labs  Lab 12/22/17  0410   WBC 12.65   RBC 3.15*   HGB 8.5*   HCT 26.9*   PLT 69*   MCV 85   MCH 27.0   MCHC 31.6*       Recent Labs  Lab 12/22/17  0410      K 4.7   *   CO2 17*   BUN 61*   CREATININE 3.8*   MG 1.7       Microbiology Data:   Microbiology Results (last 7 days)     Procedure Component Value Units Date/Time    Blood culture [[id number]] Collected:  12/18/17 1040    Order Status:  Completed Specimen:  Blood Updated:  12/21/17 1412     Blood Culture, Routine No Growth to date     Blood Culture, Routine No Growth to date     Blood Culture, Routine No Growth to date     Blood Culture, Routine No Growth to date     Blood culture [[id number]] Collected:  12/18/17 0740    Order Status:  Completed Specimen:  Blood Updated:  12/21/17 1212     Blood Culture, Routine No Growth to date     Blood Culture, Routine No Growth to date     Blood Culture, Routine No Growth to date     Blood Culture, Routine No Growth to date    Culture, Respiratory with Gram Stain [[id number]] Collected:  12/18/17 1745    Order Status:  Completed Specimen:  Respiratory from Sputum Updated:  12/20/17 1306     Respiratory Culture --     CANDIDA ALBICANS  Moderate  Normal respiratory hari also present       Gram Stain (Respiratory) <10 epithelial cells per low power field.     Gram Stain (Respiratory) Rare WBC's     Gram Stain (Respiratory) Moderate Gram negative rods     Gram Stain (Respiratory) Rare Gram positive cocci     Gram Stain (Respiratory) Rare Gram negative diplococci    Urine culture [[id number]] Collected:  12/18/17 0535    Order Status:  Completed Specimen:  Urine from Urine, Catheterized Updated:  12/19/17 0832     Urine Culture, Routine No growth           Chest Imaging:   No new imaging.     Infusions:        Scheduled Medications:    chlorhexidine  15 mL Mouth/Throat BID    enoxaparin  40 mg Subcutaneous Daily    pantoprazole  40 mg Intravenous BID       PRN Medications:   dextrose 50%, glucagon (human recombinant), insulin aspart, sodium chloride 0.9%    Assessment & Plan:   Patient Active Problem List   Diagnosis    Acute-on-chronic respiratory failure    COPD (chronic obstructive pulmonary disease)    Type 2 diabetes mellitus with stage 4 chronic kidney disease, with long-term current use of insulin    Essential hypertension    (HFpEF) heart failure with preserved ejection fraction    HLD (hyperlipidemia)    CAP (community acquired pneumonia)    Sudden cardiac arrest    Cardiac arrest    Leukocytosis    Hyperglycemia    Brain death      Ms. Sharmila Campos is a 78 y/o female, with PMH of COPD, HFpEF, HTN, HLD, IDDM, CVA, MI  here s/p cardiac arrest (unknown rhythms) due to presumed hypoxic respiratory failure secondary to COPD exacerbation though cause still unclear with ROSC > 1 hour     Neuro- CT head with impending herniation: Diffuse cerebral edema type pattern with effacement of cerebral sulci, basilar cisterns, cerebellar folia, and of the 4th ventricle.  Associated suggestion of crowding of the foramen magnum. Likely superimposed remote right frontal and left parietal infarcts.EEG with no brain activity. Neuro consulted and their exam was consistent with brain death--    Neuro: Brain death exam:  Pupils fixed and non-reactive to bright light  Corneal reflex absent  Oculocephalic reflex absent  No facial movement to noxious stimuli  Gag reflex absent  Cough reflex absent  Absence of motor response to noxious stimuli in all four limbs  Patient disconnected from vent to perform apnea test and placed on 6L NC fed through ETT to stephanie.  Spontaneous respirations absent.  Starting BP: 105/52  Starting AB AM 7.3/41/456  Connected to vent after 10 minutes and then obtained ABG.  Ending BP: 109/50 Ending AB.1/63     Rise in CO2 greater than 60 at the completion of the apnea test. This is consistent with brain death.     Time of death: 9:12 AM    This is consistent with neurology's exam which has already been documented. Family was called and notified. They are unable to be at the bedside for extubation. They understand and are fully aware that brain death has been declared and we will extubate.    Alley Bright MD  [facility] Pul[facility] Critical [facility] Fellow

## 2017-12-22 NOTE — PROGRESS NOTES
Pt received intubated and the ventilator with documented settings. ABG was obtained @ 0854;results reported to Dr. Bright. Apnea test was started at 0858 with pt off of the vent and 6LNC cut and placed down ETT;SPO2 remains 100% with HR and BP WNL. Apnea test ended at 0915;pt placed back on ventilator with same settings @ 0916. ABG was obtained;results reported to Dr. Bright. Terminally extubated pt @ 0951.

## 2017-12-22 NOTE — DISCHARGE SUMMARY
[facility] Hospital Medicine  Discharge Summary      Patient Name: Sharmila Campos  MRN: [id number]  Admission Date: 12/18/2017  Hospital Length of Stay: 4 days  Date and Time of Death:  12/22/2017 at 09:12  Attending Physician: Jeannine Xie MD   Discharging Provider: Jeannine Xie MD  Primary Care Provider: Noah Jimenez MD      HPI:   Ms. Campos is a 77 year old woman who was transferred here from [facility] on 12/17 after an episode of cardiac arrest at home while she was taking a breathing treatment.  Bystanders performed CPR for about 20 minutes and when EMS arrived she continued with CPR, 5 rounds of epinephrine, 1 dose of atropine and a dose of amiodarone.  CPR continued in the ED for about 5 minutes and an additional dose of epinephrine was given before she regained her pulse.  She was started on norepinephrine and vancomycin and transferred to [facility] on the ventilator.    Patient's medical history is notable for COPD, chronic diastolic heart failure, diabetes, HTN and previous stroke and MI.      Hospital Course:   Patient was admitted to ICU and a cooling protocol was initiated.  Her lactic acid level was elevated without a source of infection noted, and she was treated with IV Zosyn presumptively while awaiting urine and blood cultures that proved to be negative.  An EEG was done on 12/19 and showed no brain activity.  This was followed by a CT scan that showed diffuse cerebral edema and crowding of the foramen magnum consistent with impending herniation.  In addition it showed previous strokes in the right frontal and left parietal regions.    Patient's family were hoping for a miracle recovery, but eventually agreed to no chest compressions or shocks on the patient's passing.  She was seen by the neurologist to determine brain death criteria, and he found she had no sign of cortical or brainstem function on exam.  Recommended an apnea test, which patient  failed.  Care was withdrawn on 2017 and she  at 09:12.     Consults:   Consults         Status Ordering Provider     Inpatient consult to Cardiology  Once     Provider:  Yan Lucero MD    Completed VENKAT YUSUF     Inpatient consult to Neurology  Once     Provider:  Rex Alejandra MD    Completed VASILE DE ANDA     Inpatient consult to Pulmonary Critical Care  Once     Provider:  Yanira Mcneal MD    Completed VENKAT YUSUF     IP consult to dietary  Once     Provider:  (Not yet assigned)    VENKAT Olson            Final Active Diagnoses:    Diagnosis Date Noted POA    PRINCIPAL PROBLEM:  Severe hypoxic-ischemic encephalopathy [P91.63] 2017 Yes    Sudden cardiac arrest [I46.9] 2017 Yes    Acute-on-chronic respiratory failure [J96.20] 2014 Yes    Type 2 diabetes mellitus with stage 4 chronic kidney disease, with long-term current use of insulin [E11.22, N18.4, Z79.4] 2014 Not Applicable    Brain death [G93.82] 2017 Yes    (HFpEF) heart failure with preserved ejection fraction [I50.30] 2016 Yes    COPD (chronic obstructive pulmonary disease) [J44.9] 2014 Yes     Chronic    Essential hypertension [I10] 2014 Yes      Problems Resolved During this Admission:    Diagnosis Date Noted Date Resolved POA       Discharged Condition:       Vasile De Anda MD  Department of Hospital Medicine  [facility]

## 2017-12-22 NOTE — CHAPLAIN
Patient .  No family present. The patient is Bapitist and I was at the bedside with staff and offered prayer and spiritual support    CLIVE pineda Mary Darci [id number]  The patient lives in [facility], this is not a  report.    Decedent paperwork is complete.   .  Dr. Bright spoke with the family and they are aware of the patient's death.  I have called several times to follow up and have been unable to make contact or leave a message.    Will continue to attempt contact and will also mail a Resource Guide and card of condolence to the family.     The patient has dentures that will be sent with the patient's body to the  home because there is no family to receive them.     Lesli Dunn  [phone]

## 2017-12-22 NOTE — PLAN OF CARE
Problem: Patient Care Overview  Goal: Plan of Care Review  Outcome: Ongoing (interventions implemented as appropriate)  Pt received on vent with documented settings. No resp issues at this time. Will continue to monitor.

## 2017-12-22 NOTE — PLAN OF CARE
Problem: Patient Care Overview  Goal: Plan of Care Review  Outcome: Ongoing (interventions implemented as appropriate)  REMAINS UNRESPONSIVE. DECEBERATE POSTURING TO BUE WITH STIMULI. PUPILS FIXED. BREATHES WITH SET RATE ONLY. VSS. GOOD U/O.

## 2017-12-22 NOTE — SUBJECTIVE & OBJECTIVE
Interval History:  Plan for withdrawal of care today.    Review of Systems   Unable to perform ROS: Patient unresponsive     Objective:     Vital Signs (Most Recent):  Temp: 97.4 °F (36.3 °C) (12/22/17 0300)  Pulse: 60 (12/22/17 0732)  Resp: 20 (12/22/17 0732)  BP: (!) 107/56 (12/22/17 0600)  SpO2: 100 % (12/22/17 0732) Vital Signs (24h Range):  Temp:  [97.4 °F (36.3 °C)-97.7 °F (36.5 °C)] 97.4 °F (36.3 °C)  Pulse:  [58-70] 60  Resp:  [14-21] 20  SpO2:  [100 %] 100 %  BP: ()/(46-57) 107/56     Weight: 112 kg (246 lb 14.6 oz)  Body mass index is 45.16 kg/m².    Intake/Output Summary (Last 24 hours) at 12/22/17 0808  Last data filed at 12/22/17 0537   Gross per 24 hour   Intake          1527.64 ml   Output              835 ml   Net           692.64 ml      Physical Exam:  Unchanged    Significant Labs: All pertinent labs within the past 24 hours have been reviewed.    Significant Imaging: I have reviewed all pertinent imaging results/findings within the past 24 hours.

## 2017-12-23 LAB
BACTERIA BLD CULT: NORMAL
BACTERIA BLD CULT: NORMAL

## 2018-02-02 NOTE — PROGRESS NOTES
[facility] Hospital Medicine  Progress Note    Patient Name: Sharmila Campos  MRN: [id number]  Patient Class: IP- Inpatient   Admission Date: 12/18/2017  Length of Stay: 2 days  Attending Physician: Jeannine Xie MD  Primary Care Provider: Noah Jimenez MD        Subjective:     Principal Problem:Sudden cardiac arrest    HPI:  Ms. Campos is a 77 year old woman who was transferred here from [facility] on 12/17 after an episode of cardiac arrest at home while she was taking a breathing treatment.  Bystanders performed CPR for about 20 minutes and when EMS arrived she continued with CPR, 5 rounds of epinephrine, 1 dose of atropine and a dose of amiodarone.  CPR continued in the ED for about 5 minutes and an additional dose of epinephrine was given before she regained her pulse.  She was started on norepinephrine and vancomycin and transferred to [facility] on the ventilator.    Patient's medical history is notable for COPD, chronic diastolic heart failure, diabetes, HTN and previous stroke and MI.    Hospital Course:  Patient was admitted to ICU and a cooling protocol was initiated.  Her lactic acid level was elevated without a source of infection noted, and she was treated with IV Zosyn presumptively while awaiting urine and blood cultures that proved to be negative.  An EEG was done on 12/19 and showed no brain activity.  This was followed by a CT scan that showed diffuse cerebral edema and crowding of the foramen magnum consistent with impending herniation.  In addition it showed previous strokes in the right frontal and left parietal regions.    Patient's family were hoping for a miracle recovery, but eventually agreed to no chest compressions or shocks on the patient's passing.  She was seen by the neurologist to determine brain death criteria, and he found she had no sign of cortical or brainstem function on exam.  Recommended an apnea test.  She remained on the ventilator  while the family gathered for their last visits.    Interval History:  Events of yesterday with EEG showing no cerebral activity and CT with findings of irreversible ischemia.  Neurologist recommended apnea test and patient took 2 small breaths, so will remain on the ventilator until family can gather at their request.     Review of Systems   Unable to perform ROS: Patient unresponsive     Objective:     Vital Signs (Most Recent):  Temp: 98.1 °F (36.7 °C) (12/20/17 1505)  Pulse: 80 (12/20/17 1736)  Resp: 16 (12/20/17 1736)  BP: (!) 86/43 (12/20/17 1736)  SpO2: 100 % (12/20/17 1736) Vital Signs (24h Range):  Temp:  [96.1 °F (35.6 °C)-98.2 °F (36.8 °C)] 98.1 °F (36.7 °C)  Pulse:  [72-82] 80  Resp:  [15-28] 16  SpO2:  [98 %-100 %] 100 %  BP: ()/(42-58) 86/43     Weight: 110.3 kg (243 lb 2.7 oz)  Body mass index is 44.48 kg/m².    Intake/Output Summary (Last 24 hours) at 12/20/17 1801  Last data filed at 12/20/17 1700   Gross per 24 hour   Intake          2542.87 ml   Output              925 ml   Net          1617.87 ml      Physical Exam   Constitutional: She appears well-developed.   Obese woman unresponsive on ventilator.   HENT:   Head: Normocephalic.   Eyes: Conjunctivae are normal.   Neck: Neck supple. No thyromegaly present.   Cardiovascular: Normal rate, regular rhythm, normal heart sounds and intact distal pulses.  Exam reveals no gallop and no friction rub.    No murmur heard.  Pulmonary/Chest: She has no wheezes. She has no rales.   Breathing unlabored on ventilator.  Coarse ventilator noise appreciated.   Abdominal: Soft. She exhibits distension. There is no tenderness.   Bowel sounds decreased.   Musculoskeletal: She exhibits no edema.   Lymphadenopathy:     She has no cervical adenopathy.   Neurological:   Unresponsive to voice or touch.  Not following commands.  No oculocephalic reflex noted.   Skin: Skin is warm and dry. No rash noted.       Significant Labs: All pertinent labs within the past 24  hours have been reviewed.    Significant Imaging: I have reviewed all pertinent imaging results/findings within the past 24 hours.    Assessment/Plan:      * Sudden cardiac arrest    - 20 minutes of bystander CPR in field and 1 hour of coding with EMS  - Requiring pressor support  - Hypothermia protocol complete and no evidence of recovery  - EEG without brain activity  - Family still reluctant to accept patient's passing and she will remain on ventilator until they can be together.        Acute-on-chronic respiratory failure    - History of COPD on home oxygen  - Intubated and on ventilator   - Pulmonary/critical care following        Type 2 diabetes mellitus with stage 4 chronic kidney disease, with long-term current use of insulin    - Patient hyperglycemic   - She was on detemir 35 U qhs and sliding scale at home  - A1C 10.3  - Continue SSI - Accuchecks are starting to decrease while patient NPO and expect this trend to continue  - Poor renal function at baseline, but making urine        Hyperglycemia    - glucose in ED of 464  - glucose upon arrival to ICU of 233  - IV fluids at 125 cc/hour   - no apparent DKA at this time   - Patient NPO with SSRI for NPO patients   - unclear when patient takes long acting insulin; ordered for PM administration   - monitor           Leukocytosis    - WBC count of 16.3 in ED, reduced to   - etiology unclear at this time    - CXR in this facility without active infection   - UA not convincing for UTI  - patient started on vancomycin   - vancomycin panel ordered   - blood cultures drawn at [facility]   - repeat blood cultures drawn   - lactate elevated at [facility] to 5.52   - patient arrived with IV fluids at 250 cc/hour   - reduced iv fluids to 125 cc/hour 2/2 h/o heart failure with EF of 50% > 1 year ago   - repeat lactate measure ordered             HLD (hyperlipidemia)    - lipid panel ordered for AM   - last Lipid panel:   Results for JIGAR JOHNSON (MRN [id number])  as of 12/18/2017 02:09   Ref. Range 9/4/2016 04:43   Cholesterol Latest Ref Range: 120 - 199 mg/dL 154   HDL Latest Ref Range: 40 - 75 mg/dL 43   LDL Cholesterol Latest Ref Range: 63.0 - 159.0 mg/dL 99.2   Total Cholesterol/HDL Ratio Latest Ref Range: 2.0 - 5.0  3.6   Triglycerides Latest Ref Range: 30 - 150 mg/dL 59           (HFpEF) heart failure with preserved ejection fraction    - 2D echo with diastolic dysfunction only          Essential hypertension    - Currently requiring pressor support.        COPD (chronic obstructive pulmonary disease)    - Cardiac arrest occurred in conjunction with patient giving herself a respiratory treatment  - follows with pulmonology: Dr. Ventura  - as of 1/2017 she was on home oxygen 2 liters            VTE Risk Mitigation         Ordered     enoxaparin injection 40 mg  Daily     Route:  Subcutaneous        12/18/17 1330     Medium Risk of VTE  Once      12/18/17 0304     Place sequential compression device  Until discontinued      12/18/17 0304              Jeannine Wilhelm MD  Department of Hospital Medicine   [facility]   Statement Selected

## 2020-09-02 NOTE — NURSING
Apnea test performed at bedside with resp therapy, RN, and Crit Care MD. Patient failed apnea test. Care was withdrawn . Family updated on plan of care throughout morning but declined visit. MD notified family of pt expiration.   Patient Education        Child's Well Visit, 24 Months: Care Instructions  Your Care Instructions     You can help your toddler through this exciting year by giving love and setting limits. Most children learn to use the toilet between ages 3 and 3. You can help your child with potty training. Keep reading to your child. It helps his or her brain grow and strengthens your bond. Your 3year-old's body, mind, and emotions are growing quickly. Your child may be able to put two (and maybe three) words together. Toddlers are full of energy, and they are curious. Your child may want to open every drawer, test how things work, and often test your patience. This happens because your child wants to be independent. But he or she still wants you to give guidance. Follow-up care is a key part of your child's treatment and safety. Be sure to make and go to all appointments, and call your doctor if your child is having problems. It's also a good idea to know your child's test results and keep a list of the medicines your child takes. How can you care for your child at home? Safety  · Help prevent your child from choking by offering the right kinds of foods and watching out for choking hazards. · Watch your child at all times near the street or in a parking lot. Drivers may not be able to see small children. Know where your child is and check carefully before backing your car out of the driveway. · Watch your child at all times when he or she is near water, including pools, hot tubs, buckets, bathtubs, and toilets. · For every ride in a car, secure your child into a properly installed car seat that meets all current safety standards. For questions about car seats, call the [employer] at 4-536.204.8401. · Make sure your child cannot get burned. Keep hot pots, curling irons, irons, and coffee cups out of his or her reach. Put plastic plugs in all electrical sockets.  Put in smoke detectors that the body makes \"pee\" and \"poop\" every day and that those things need to go into the toilet. Ask your child to \"help the poop get into the toilet. \"  · Praise your child with hugs and kisses when he or she uses the potty. Support your child when he or she has an accident. (\"That is okay. Accidents happen. \")  Immunizations  Make sure that your child gets all the recommended childhood vaccines, which help keep your baby healthy and prevent the spread of disease. When should you call for help? Watch closely for changes in your child's health, and be sure to contact your doctor if:  · You are concerned that your child is not growing or developing normally. · You are worried about your child's behavior. · You need more information about how to care for your child, or you have questions or concerns. Where can you learn more? Go to [email or web address] and sign in to your [employer] account. Enter W451 in the [employer] box to learn more about \"Child's Well Visit, 24 Months: Care Instructions. \"     If you do not have an account, please click on the \"Sign Up Now\" link. Current as of: August 22, 2019               Content Version: 12.5  © 3924-0916 [employer], Incorporated. Care instructions adapted under license by [facility] ([facility]). If you have questions about a medical condition or this instruction, always ask your healthcare professional. [place] [address] any warranty or liability for your use of this information.

## 2024-09-09 NOTE — HPI
Ms. Campos is a 77 year old woman who was transferred here from [facility] on 12/17 after an episode of cardiac arrest at home while she was taking a breathing treatment.  Bystanders performed CPR for about 20 minutes and when EMS arrived she continued with CPR, 5 rounds of epinephrine, 1 dose of atropine and a dose of amiodarone.  CPR continued in the ED for about 5 minutes and an additional dose of epinephrine was given before she regained her pulse.  She was started on norepinephrine and vancomycin and transferred to [facility] on the ventilator.    Patient's medical history is notable for COPD, chronic diastolic heart failure, diabetes, HTN and previous stroke and MI.   within normal limits within normal limits